# Patient Record
Sex: FEMALE | Race: WHITE | Employment: OTHER | URBAN - METROPOLITAN AREA
[De-identification: names, ages, dates, MRNs, and addresses within clinical notes are randomized per-mention and may not be internally consistent; named-entity substitution may affect disease eponyms.]

---

## 2017-09-19 ENCOUNTER — APPOINTMENT (OUTPATIENT)
Dept: CT IMAGING | Age: 82
DRG: 440 | End: 2017-09-19
Attending: EMERGENCY MEDICINE
Payer: MEDICARE

## 2017-09-19 ENCOUNTER — HOSPITAL ENCOUNTER (INPATIENT)
Age: 82
LOS: 3 days | Discharge: HOME | DRG: 440 | End: 2017-09-22
Attending: EMERGENCY MEDICINE | Admitting: HOSPITALIST
Payer: MEDICARE

## 2017-09-19 DIAGNOSIS — I25.10 CAD, MULTIPLE VESSEL: ICD-10-CM

## 2017-09-19 DIAGNOSIS — K85.90 ACUTE PANCREATITIS, UNSPECIFIED COMPLICATION STATUS, UNSPECIFIED PANCREATITIS TYPE: Primary | ICD-10-CM

## 2017-09-19 LAB
ALBUMIN SERPL-MCNC: 4.2 G/DL (ref 3.4–5)
ALBUMIN/GLOB SERPL: 1.3 {RATIO} (ref 0.8–1.7)
ALP SERPL-CCNC: 100 U/L (ref 45–117)
ALT SERPL-CCNC: 158 U/L (ref 13–56)
ANION GAP BLD CALC-SCNC: 14 MMOL/L (ref 10–20)
AST SERPL-CCNC: 247 U/L (ref 15–37)
BASOPHILS # BLD: 0 K/UL (ref 0–0.06)
BASOPHILS NFR BLD: 0 % (ref 0–2)
BILIRUB DIRECT SERPL-MCNC: 0.7 MG/DL (ref 0–0.2)
BILIRUB SERPL-MCNC: 1.4 MG/DL (ref 0.2–1)
BUN BLD-MCNC: 14 MG/DL (ref 7–18)
CA-I BLD-MCNC: 1.29 MMOL/L (ref 1.12–1.32)
CHLORIDE BLD-SCNC: 92 MMOL/L (ref 100–108)
CK MB CFR SERPL CALC: NORMAL % (ref 0–4)
CK MB SERPL-MCNC: <1 NG/ML (ref 5–25)
CK SERPL-CCNC: 34 U/L (ref 26–192)
CO2 BLD-SCNC: 30 MMOL/L (ref 19–24)
CREAT UR-MCNC: 0.5 MG/DL (ref 0.6–1.3)
DIFFERENTIAL METHOD BLD: ABNORMAL
EOSINOPHIL # BLD: 0 K/UL (ref 0–0.4)
EOSINOPHIL NFR BLD: 0 % (ref 0–5)
ERYTHROCYTE [DISTWIDTH] IN BLOOD BY AUTOMATED COUNT: 12.9 % (ref 11.6–14.5)
GLOBULIN SER CALC-MCNC: 3.3 G/DL (ref 2–4)
GLUCOSE BLD STRIP.AUTO-MCNC: 145 MG/DL (ref 74–106)
HCT VFR BLD AUTO: 39.4 % (ref 35–45)
HCT VFR BLD CALC: 44 % (ref 36–49)
HGB BLD-MCNC: 13.8 G/DL (ref 12–16)
HGB BLD-MCNC: 15 G/DL (ref 12–16)
INR PPP: 1 (ref 0.8–1.2)
LACTATE BLD-SCNC: 1.3 MMOL/L (ref 0.4–2)
LIPASE SERPL-CCNC: ABNORMAL U/L (ref 73–393)
LYMPHOCYTES # BLD: 1.1 K/UL (ref 0.9–3.6)
LYMPHOCYTES NFR BLD: 7 % (ref 21–52)
MCH RBC QN AUTO: 31.5 PG (ref 24–34)
MCHC RBC AUTO-ENTMCNC: 35 G/DL (ref 31–37)
MCV RBC AUTO: 90 FL (ref 74–97)
MONOCYTES # BLD: 1 K/UL (ref 0.05–1.2)
MONOCYTES NFR BLD: 7 % (ref 3–10)
NEUTS SEG # BLD: 13 K/UL (ref 1.8–8)
NEUTS SEG NFR BLD: 86 % (ref 40–73)
PLATELET # BLD AUTO: 294 K/UL (ref 135–420)
PMV BLD AUTO: 9.1 FL (ref 9.2–11.8)
POTASSIUM BLD-SCNC: 3.4 MMOL/L (ref 3.5–5.5)
PROT SERPL-MCNC: 7.5 G/DL (ref 6.4–8.2)
PROTHROMBIN TIME: 12.8 SEC (ref 11.5–15.2)
RBC # BLD AUTO: 4.38 M/UL (ref 4.2–5.3)
SODIUM BLD-SCNC: 131 MMOL/L (ref 136–145)
TROPONIN I SERPL-MCNC: <0.02 NG/ML (ref 0–0.04)
WBC # BLD AUTO: 15.1 K/UL (ref 4.6–13.2)

## 2017-09-19 PROCEDURE — 74176 CT ABD & PELVIS W/O CONTRAST: CPT

## 2017-09-19 PROCEDURE — 93005 ELECTROCARDIOGRAM TRACING: CPT

## 2017-09-19 PROCEDURE — 80076 HEPATIC FUNCTION PANEL: CPT | Performed by: EMERGENCY MEDICINE

## 2017-09-19 PROCEDURE — 80047 BASIC METABLC PNL IONIZED CA: CPT

## 2017-09-19 PROCEDURE — 83690 ASSAY OF LIPASE: CPT | Performed by: EMERGENCY MEDICINE

## 2017-09-19 PROCEDURE — 77030011943

## 2017-09-19 PROCEDURE — 74011000250 HC RX REV CODE- 250: Performed by: EMERGENCY MEDICINE

## 2017-09-19 PROCEDURE — 82550 ASSAY OF CK (CPK): CPT | Performed by: EMERGENCY MEDICINE

## 2017-09-19 PROCEDURE — 74011250636 HC RX REV CODE- 250/636: Performed by: EMERGENCY MEDICINE

## 2017-09-19 PROCEDURE — 96376 TX/PRO/DX INJ SAME DRUG ADON: CPT

## 2017-09-19 PROCEDURE — 96375 TX/PRO/DX INJ NEW DRUG ADDON: CPT

## 2017-09-19 PROCEDURE — 99285 EMERGENCY DEPT VISIT HI MDM: CPT

## 2017-09-19 PROCEDURE — 65270000029 HC RM PRIVATE

## 2017-09-19 PROCEDURE — 85610 PROTHROMBIN TIME: CPT | Performed by: EMERGENCY MEDICINE

## 2017-09-19 PROCEDURE — 83605 ASSAY OF LACTIC ACID: CPT

## 2017-09-19 PROCEDURE — 85025 COMPLETE CBC W/AUTO DIFF WBC: CPT | Performed by: EMERGENCY MEDICINE

## 2017-09-19 PROCEDURE — 96374 THER/PROPH/DIAG INJ IV PUSH: CPT

## 2017-09-19 RX ORDER — SODIUM CHLORIDE, SODIUM LACTATE, POTASSIUM CHLORIDE, CALCIUM CHLORIDE 600; 310; 30; 20 MG/100ML; MG/100ML; MG/100ML; MG/100ML
100 INJECTION, SOLUTION INTRAVENOUS CONTINUOUS
Status: DISCONTINUED | OUTPATIENT
Start: 2017-09-19 | End: 2017-09-21

## 2017-09-19 RX ORDER — ONDANSETRON 2 MG/ML
4 INJECTION INTRAMUSCULAR; INTRAVENOUS
Status: COMPLETED | OUTPATIENT
Start: 2017-09-19 | End: 2017-09-19

## 2017-09-19 RX ORDER — FAMOTIDINE 10 MG/ML
20 INJECTION INTRAVENOUS
Status: COMPLETED | OUTPATIENT
Start: 2017-09-19 | End: 2017-09-19

## 2017-09-19 RX ORDER — MORPHINE SULFATE 2 MG/ML
2 INJECTION, SOLUTION INTRAMUSCULAR; INTRAVENOUS
Status: COMPLETED | OUTPATIENT
Start: 2017-09-19 | End: 2017-09-19

## 2017-09-19 RX ADMIN — MORPHINE SULFATE 2 MG: 2 INJECTION, SOLUTION INTRAMUSCULAR; INTRAVENOUS at 22:36

## 2017-09-19 RX ADMIN — ONDANSETRON 4 MG: 2 INJECTION INTRAMUSCULAR; INTRAVENOUS at 22:37

## 2017-09-19 RX ADMIN — SODIUM CHLORIDE, SODIUM LACTATE, POTASSIUM CHLORIDE, AND CALCIUM CHLORIDE 100 ML/HR: 600; 310; 30; 20 INJECTION, SOLUTION INTRAVENOUS at 23:35

## 2017-09-19 RX ADMIN — FAMOTIDINE 20 MG: 10 INJECTION, SOLUTION INTRAVENOUS at 22:36

## 2017-09-19 RX ADMIN — ONDANSETRON 4 MG: 2 INJECTION INTRAMUSCULAR; INTRAVENOUS at 23:26

## 2017-09-19 NOTE — IP AVS SNAPSHOT
[facility]ho[facility] Paredes 
 
 
 [address] [place] [address] 
[phone] Patient: Aaron Barragan MRN: [id number] Current Discharge Medication List  
  
CONTINUE these medications which have NOT CHANGED Dose & Instructions Dispensing Information Comments Morning Noon Evening Bedtime  
 aspirin 81 mg chewable tablet Dose:  81 mg Take 81 mg by mouth daily. Refills:  0  
     
  
   
   
   
  
 cholecalciferol (VITAMIN D3) 5,000 unit Tab tablet Commonly known as:  VITAMIN D3 Dose:  5000 Units Take 5,000 Units by mouth daily. Refills:  0  
     
  
   
   
   
  
 COLACE 100 mg capsule Generic drug:  docusate sodium Dose:  100 mg Take 100 mg by mouth daily. Refills:  0  
     
  
   
   
   
  
 cyanocobalamin 500 mcg tablet Commonly known as:  VITAMIN B12 Dose:  500 mcg Take 500 mcg by mouth daily. Refills:  0  
     
  
   
   
   
  
 dilTIAZem 120 mg SR capsule Commonly known as:  [facility] Dose:  120 mg Take 120 mg by mouth two (2) times a day. Refills:  0  
     
  
   
   
  
   
  
 fenofibrate nanocrystallized 48 mg tablet Commonly known as:  [employer] Dose:  48 mg Take 48 mg by mouth daily. Refills:  0  
     
  
   
   
   
  
 metoprolol tartrate 25 mg tablet Commonly known as:  [facility] Dose:  37.5 mg Take 37.5 mg by mouth two (2) times a day. Refills:  0  
     
  
   
   
  
   
  
 thiamine 100 mg tablet Commonly known as:  B-1 Dose:  100 mg Take 100 mg by mouth daily. Refills:  0  
     
  
   
   
   
  
 TYLENOL EXTRA STRENGTH 500 mg tablet Generic drug:  acetaminophen Dose:  500 mg Take 500 mg by mouth every four (4) hours as needed for Pain. Refills:  0

## 2017-09-19 NOTE — IP AVS SNAPSHOT
Felisha Ash 
 
 
 [address] [place] [address] 
[phone] Patient: Shoaib Jimenez MRN: [id number] You are allergic to the following Allergen Reactions Contrast Agent (Iodine) Anaphylaxis Amoxicillin Unknown (comments) Recent Documentation Height Weight Breastfeeding? BMI  
  
 1.702 m 68.1 kg No 23.51 kg/m2 Emergency Contacts Name Discharge Info Relation Home Work Mobile Tam Mann DISCHARGE CAREGIVER [3] Child [2] [phone] Kyrie Mann DISCHARGE CAREGIVER [3] Child [2]   [phone] About your hospitalization You were admitted on:  September 19, 2017 You last received care in the:  [facility] You were discharged on:  September 22, 2017 Unit phone number:  [phone] Why you were hospitalized Your primary diagnosis was:  Not on File Your diagnoses also included:  Acute Pancreatitis Providers Seen During Your Hospitalizations Provider Role Specialty Primary office phone Dot Vital MD Attending Provider Emergency Medicine [phone] Rakesh Real MD Attending Provider Internal Medicine [phone] Puja Connors MD Attending Provider Internal Medicine [phone] Your Primary Care Physician (PCP) Primary Care Physician Office Phone Office Fax Ga Sinha [phone] Follow-up Information Follow up With Details Comments Contact Info Bethanie Jon MD   [address] [address] 
[phone] Bethanie Jon MD Schedule an appointment as soon as possible for a visit in 1 week Follow up [facility] [address] [address] 
[phone] Current Discharge Medication List  
  
CONTINUE these medications which have NOT CHANGED Dose & Instructions Dispensing Information Comments Morning Noon Evening Bedtime aspirin 81 mg chewable tablet Dose:  81 mg Take 81 mg by mouth daily. Refills:  0  
     
  
   
   
   
  
 cholecalciferol (VITAMIN D3) 5,000 unit Tab tablet Commonly known as:  VITAMIN D3 Dose:  5000 Units Take 5,000 Units by mouth daily. Refills:  0  
     
  
   
   
   
  
 COLACE 100 mg capsule Generic drug:  docusate sodium Dose:  100 mg Take 100 mg by mouth daily. Refills:  0  
     
  
   
   
   
  
 cyanocobalamin 500 mcg tablet Commonly known as:  VITAMIN B12 Dose:  500 mcg Take 500 mcg by mouth daily. Refills:  0  
     
  
   
   
   
  
 dilTIAZem 120 mg SR capsule Commonly known as:  [facility] Dose:  120 mg Take 120 mg by mouth two (2) times a day. Refills:  0  
     
  
   
   
  
   
  
 fenofibrate nanocrystallized 48 mg tablet Commonly known as:  [employer] Dose:  48 mg Take 48 mg by mouth daily. Refills:  0  
     
  
   
   
   
  
 metoprolol tartrate 25 mg tablet Commonly known as:  LOPRESSOR Dose:  37.5 mg Take 37.5 mg by mouth two (2) times a day. Refills:  0  
     
  
   
   
  
   
  
 thiamine 100 mg tablet Commonly known as:  B-1 Dose:  100 mg Take 100 mg by mouth daily. Refills:  0  
     
  
   
   
   
  
 TYLENOL EXTRA STRENGTH 500 mg tablet Generic drug:  acetaminophen Dose:  500 mg Take 500 mg by mouth every four (4) hours as needed for Pain. Refills:  0 Discharge Instructions DISCHARGE SUMMARY from Nurse The following personal items are in your possession at time of discharge: 
 
Dental Appliances: None Visual Aid: Glasses, With patient Home Medications: None Jewelry: Earrings, Ring, With patient Clothing: Pants, Shirt, Undergarments, With patient Other Valuables: Eyeglasses, With patient PATIENT INSTRUCTIONS: 
 
 
F-face looks uneven A-arms unable to move or move unevenly S-speech slurred or non-existent T-time-call 911 as soon as signs and symptoms begin-DO NOT go Back to bed or wait to see if you get better-TIME IS BRAIN. Warning Signs of HEART ATTACK Call 911 if you have these symptoms: 
? Chest discomfort. Most heart attacks involve discomfort in the center of the chest that lasts more than a few minutes, or that goes away and comes back. It can feel like uncomfortable pressure, squeezing, fullness, or pain. ? Discomfort in other areas of the upper body. Symptoms can include pain or discomfort in one or both arms, the back, neck, jaw, or stomach. ? Shortness of breath with or without chest discomfort. ? Other signs may include breaking out in a cold sweat, nausea, or lightheadedness. Don't wait more than five minutes to call [address]! Fast action can save your life. Calling 911 is almost always the fastest way to get lifesaving treatment. Emergency Medical Services staff can begin treatment when they arrive  up to an hour sooner than if someone gets to the hospital by car. The discharge information has been reviewed with the patient. The patient verbalized understanding. Discharge medications reviewed with the patient and appropriate educational materials and side effects teaching were provided. I have reviewed discharge instructions with the patient. The patient verbalized understanding. Patient armband removed and shredded Discharge Instructions Attachments/References ANGINA (ENGLISH) CORONARY ANGIOGRAM: POST-OP (ENGLISH) DIABETES: HEART DISEASE: GENERAL INFO (ENGLISH) HEART: EXTERNAL VIEW - ARTERIES: ANATOMY SKETCH (ENGLISH) PANCREATITIS (ENGLISH) Discharge Orders None Introducing [facility]! Arleen Harmon introduces Wistone patient portal. Now you can access parts of your medical record, email your doctor's office, and request medication refills online. 1. In your internet browser, go to [email or web address] 2. Click on the First Time User? Click Here link in the Sign In box. You will see the New Member Sign Up page. 3. Enter your Wistone Access Code exactly as it appears below. You will not need to use this code after youve completed the sign-up process. If you do not sign up before the expiration date, you must request a new code. · Wistone Access Code: [email or web address] Expires: 12/21/2017  3:34 PM 
 
4. Enter the last four digits of your Social Security Number (xxxx) and Date of Birth (mm/dd/yyyy) as indicated and click Submit. You will be taken to the next sign-up page. 5. Create a Wistone ID. This will be your Wistone login ID and cannot be changed, so think of one that is secure and easy to remember. 6. Create a Wistone password. You can change your password at any time. 7. Enter your Password Reset Question and Answer. This can be used at a later time if you forget your password. 8. Enter your e-mail address. You will receive e-mail notification when new information is available in [address] 9. Click Sign Up. You can now view and download portions of your medical record. 10. Click the Download Summary menu link to download a portable copy of your medical information. If you have questions, please visit the Frequently Asked Questions section of the Wistone website. Remember, Wistone is NOT to be used for urgent needs. For medical emergencies, dial 911. Now available from your iPhone and Android! General Information Please provide this summary of care documentation to your next provider.  
  
  
    
    
 Patient Signature: ____________________________________________________________ Date:  ____________________________________________________________  
  
[facility] Provider Signature:  ____________________________________________________________ Date:  ____________________________________________________________ More Information Angina: Care Instructions Your Care Instructions You have a problem called angina. Angina happens when there is not enough blood flow to your heart muscle. Angina is a sign of coronary artery disease (CAD). CAD occurs when blood vessels that supply the heart become narrowed. Having CAD increases your risk of a heart attack. Chest pain or pressure is the most common symptom of angina. But some people have other symptoms, like: 
· Pain, pressure, or a strange feeling in the back, neck, jaw, or upper belly, or in one or both shoulders or arms. · Shortness of breath. · Nausea or vomiting. · Lightheadedness or sudden weakness. · Fast or irregular heartbeat. Women are somewhat more likely than men to have angina symptoms like shortness of breath, nausea, and back or jaw pain. Angina can be dangerous. That's why it is important to pay attention to your symptoms. Know what is typical for you, learn how to control your symptoms, and understand when you need to get treatment. A change in your usual pattern of symptoms is an emergency. It may mean that you are having a heart attack. The doctor has checked you carefully, but problems can develop later. If you notice any problems or new symptoms, get medical treatment right away. Follow-up care is a key part of your treatment and safety. Be sure to make and go to all appointments, and call your doctor if you are having problems. It's also a good idea to know your test results and keep a list of the medicines you take. How can you care for yourself at home? Medicines · If your doctor has given you nitroglycerin for angina symptoms, keep it with you at all times. If you have symptoms, sit down and rest, and take the first dose of nitroglycerin as directed. If your symptoms get worse or are not getting better within 5 minutes, call 911 right away. Stay on the phone. The emergency  will give you further instructions. · If your doctor advises it, take 1 low-dose aspirin a day to prevent heart attack. · Be safe with medicines. Take your medicines exactly as prescribed. Call your doctor if you think you are having a problem with your medicine. You will get more details on the specific medicines your doctor prescribes. Lifestyle changes · Do not smoke. If you need help quitting, talk to your doctor about stop-smoking programs and medicines. These can increase your chances of quitting for good. · Eat a heart-healthy diet that is low in saturated fat and salt, and is high in fiber. Talk to your doctor or a dietitian about healthy eating. · Stay at a healthy weight. Or lose weight if you need to. Activity · Talk to your doctor about a level of activity that is safe for you. · If an activity causes angina symptoms, stop and rest. 
When should you call for help? Call 911 anytime you think you may need emergency care. For example, call if: 
· You passed out (lost consciousness). · You have symptoms of a heart attack. These may include: ¨ Chest pain or pressure, or a strange feeling in the chest. 
¨ Sweating. ¨ Shortness of breath. ¨ Nausea or vomiting. ¨ Pain, pressure, or a strange feeling in the back, neck, jaw, or upper belly or in one or both shoulders or arms. ¨ Lightheadedness or sudden weakness. ¨ A fast or irregular heartbeat. After you call 911, the  may tell you to chew 1 adult-strength or 2 to 4 low-dose aspirin. Wait for an ambulance. Do not try to drive yourself.  
· You have angina symptoms that do not go away with rest or are not getting better within 5 minutes after you take a dose of nitroglycerin. Call your doctor now or seek immediate medical care if: 
· You are having angina symptoms more often than usual, or they are different or worse than usual. 
· You feel dizzy or lightheaded, or you feel like you may faint. Watch closely for changes in your health, and be sure to contact your doctor if you have any problems. Where can you learn more? Go to http://guillermo-amy.info/. Enter H129 in the search box to learn more about \"Angina: Care Instructions. \" Current as of: April 3, 2017 Content Version: 11.3 © 1676-0226 [employer]. Care instructions adapted under license by [employer] (which disclaims liability or warranty for this information). If you have questions about a medical condition or this instruction, always ask your healthcare professional. [address] any warranty or liability for your use of this information. Coronary Angiogram: What to Expect at [facility] Your Recovery A coronary angiogram is a test to examine the large blood vessel of your heart (coronary artery). The doctor inserted a thin, flexible tube (catheter) into a blood vessel in your groin. In some cases, the catheter is placed in a blood vessel in the arm. Your groin or arm may have a bruise and feel sore for a day or two after a coronary angiogram. You can do light activities around the house but nothing strenuous for several days. This care sheet gives you a general idea about how long it will take for you to recover. But each person recovers at a different pace. Follow the steps below to feel better as quickly as possible. How can you care for yourself at home? Activity · If the doctor gave you a sedative: ¨ For 24 hours, don't do anything that requires attention to detail. It takes time for the medicine's effects to completely wear off. ¨ For your safety, do not drive or operate any machinery that could be dangerous. Wait until the medicine wears off and you can think clearly and react easily. · Do not do strenuous exercise and do not lift, pull, or push anything heavy until your doctor says it is okay. This may be for a day or two. You can walk around the house and do light activity, such as cooking. · If the catheter was placed in your groin, try not to walk up stairs for the first couple of days. · If the catheter was placed in your arm near your wrist, do not bend your wrist deeply for the first couple of days. Be careful using your hand to get into and out of a chair or bed. · If your doctor recommends it, get more exercise. Walking is a good choice. Bit by bit, increase the amount you walk every day. Try for at least 30 minutes on most days of the week. Diet · Drink plenty of fluids to help your body flush out the dye. If you have kidney, heart, or liver disease and have to limit fluids, talk with your doctor before you increase the amount of fluids you drink. · Keep eating a heart-healthy diet that has lots of fruits, vegetables, and whole grains. If you have not been eating this way, talk to your doctor. You also may want to talk to a dietitian. This expert can help you to learn about healthy foods and plan meals. Medicines · Your doctor will tell you if and when you can restart your medicines. He or she will also give you instructions about taking any new medicines. · If you take blood thinners, such as warfarin (Coumadin), clopidogrel (Plavix), or aspirin, be sure to talk to your doctor. He or she will tell you if and when to start taking those medicines again. Make sure that you understand exactly what your doctor wants you to do. · Your doctor may prescribe a blood-thinning medicine like aspirin or clopidogrel (Plavix).  It is very important that you take these medicines exactly as directed in order to keep the coronary artery open and reduce your risk of a heart attack. Be safe with medicines. Call your doctor if you think you are having a problem with your medicine. Care of the catheter site · For 1 or 2 days, keep a bandage over the spot where the catheter was inserted. The bandage probably will fall off in this time. · Put ice or a cold pack on the area for 10 to 20 minutes at a time to help with soreness or swelling. Put a thin cloth between the ice and your skin. · You may shower 24 to 48 hours after the procedure, if your doctor okays it. Pat the incision dry. · Do not soak the catheter site until it is healed. Don't take a bath for 1 week, or until your doctor tells you it is okay. Follow-up care is a key part of your treatment and safety. Be sure to make and go to all appointments, and call your doctor if you are having problems. It's also a good idea to know your test results and keep a list of the medicines you take. When should you call for help? Call 911 anytime you think you may need emergency care. For example, call if: 
· You passed out (lost consciousness). · You have severe trouble breathing. · You have sudden chest pain and shortness of breath, or you cough up blood. · You have symptoms of a heart attack. These may include: ¨ Chest pain or pressure, or a strange feeling in the chest. 
¨ Sweating. ¨ Shortness of breath. ¨ Nausea or vomiting. ¨ Pain, pressure, or a strange feeling in the back, neck, jaw, or upper belly, or in one or both shoulders or arms. ¨ Lightheadedness or sudden weakness. ¨ A fast or irregular heartbeat. After you call 911, the  may tel you to chew 1 adult-strength or 2 to 4 low-dose aspirin. Wait for an ambulance. Do not try to drive yourself. · You have been diagnosed with angina, and you have symptoms that do not go away with rest or are not getting better within 5 minutes after you take a dose of nitroglycerin. Call your doctor now or seek immediate medical care if: 
· You are bleeding from the area where the catheter was put in your artery. · You have a fast-growing, painful lump at the catheter site. · You have signs of infection, such as: 
¨ Increased pain, swelling, warmth, or redness. ¨ Red streaks leading from the catheter site. ¨ Pus draining from the catheter site. ¨ A fever. · Your leg or arm looks blue or feels cold, numb, or tingly. Watch closely for changes in your health, and be sure to contact your doctor if you have any problems. Where can you learn more? Go to http://guillermo-amy.info/. Enter R132 in the search box to learn more about \"Coronary Angiogram: What to Expect at Home. \" Current as of: January 13, 2017 Content Version: 11.3 © 9027-5129 [employer]. Care instructions adapted under license by [employer] (which disclaims liability or warranty for this information). If you have questions about a medical condition or this instruction, always ask your healthcare professional. [place] [address] any warranty or liability for your use of this information. Learning About Diabetes and Coronary Artery Disease How are diabetes and heart disease connected? Many people think diabetes and heart disease go hand in hand. But having diabetes doesn't have to mean that you are going to have a heart attack someday. Healthy living can help prevent many of the problems that come with both diabetes and heart disease. For some people, diabetes can cause problems in your body that may lead to heart disease. Diabetes can make the problems of heart disease worse. But here's the good news: The good things you're doing to stay healthy with diabeteseating healthy foods, quitting smoking, getting exercise and moreare also helping your heart. How can diabetes lead to heart disease? The same things that make diabetes a serious condition can also lead to heart disease or make it worse. · High cholesterol causes the buildup of a kind of fat inside the blood vessel walls, making them too narrow. This reduces the flow of blood and can cause a heart attack. · High blood pressure pushes blood through the arteries with too much force. Over time, this damages the walls of the arteries. · High blood sugar can damage the lining of blood vessels. This can lead to the hardening and narrowing of the arteries, resulting in less blood flow to the heart. Diabetes also increases your risk for kidney damage. If you have signs of kidney damage, you may also have a higher risk for heart disease. Kidney damage shares many of the risk factors for heart disease (such as high cholesterol, high blood pressure, and high blood sugar). How can you keep your heart healthy when you have diabetes? Managing your diabetes and keeping your heart healthy are two sides of the same coin. Here are some things you can do. · Test your blood sugar levels and get your diabetes tests on schedule. Try to keep your numbers within your target range. · Keep track of your blood pressure. The target for most people with diabetes is below 140/90. Your doctor will give you a goal that's right for you. If your blood pressure is high, your treatment may also include medicine. Changes in your lifestyle, such as staying at a healthy weight, may also help you lower your blood pressure. · Eat heart-healthy foods. These include fruits, vegetables, whole grains, fish, and low-fat or nonfat dairy foods. Limit sodium, alcohol, and sweets. · If your doctor recommends it, get more exercise. Walking is a good choice. Bit by bit, increase the amount you walk every day. Try for at least 30 minutes on most days of the week. · Do not smoke. Smoking can make diabetes and heart disease worse.  If you need help quitting, talk to your doctor about stop-smoking programs and medicines. These can increase your chances of quitting for good. · Your doctor may talk with you about taking medicines for your heart. For example, your doctor may suggest taking a statin or daily aspirin. Where can you learn more? Go to http://guillermo-amy.info/. Enter B338 in the search box to learn more about \"Learning About Diabetes and Coronary Artery Disease. \" Current as of: March 13, 2017 Content Version: 11.3 © 9147-6390 [employer]. Care instructions adapted under license by [employer] (which disclaims liability or warranty for this information). If you have questions about a medical condition or this instruction, always ask your healthcare professional. [address] any warranty or liability for your use of this information. External View of the Heart: Anatomy Sketch Current as of: January 5, 2017 Content Version: 11.3 © 2279-8169 [employer]. Care instructions adapted under license by [employer] (which disclaims liability or warranty for this information). If you have questions about a medical condition or this instruction, always ask your healthcare professional. [address] any warranty or liability for your use of this information. Pancreatitis: Care Instructions Your Care Instructions The pancreas is an organ behind the stomach. It makes hormones and enzymes to help your body digest food. But if these enzymes attack the pancreas, it can get inflamed. This is called pancreatitis. Most cases are caused by gallstones or by heavy alcohol use. If you take care of yourself at home, it will help you get better. It will also help you avoid more problems with your pancreas. Follow-up care is a key part of your treatment and safety.  Be sure to make and go to all appointments, and call your doctor if you are having problems. It's also a good idea to know your test results and keep a list of the medicines you take. How can you care for yourself at home? · Drink clear liquids and eat bland foods until you feel better. [place] foods include rice, dry toast, and crackers. They also include bananas and applesauce. · Eat a low-fat diet until your doctor says your pancreas is healed. · Do not drink alcohol. Tell your doctor if you need help to quit. Counseling, support groups, and sometimes medicines can help you stay sober. · Be safe with medicines. Read and follow all instructions on the label. ¨ If the doctor gave you a prescription medicine for pain, take it as prescribed. ¨ If you are not taking a prescription pain medicine, ask your doctor if you can take an over-the-counter medicine. · If your doctor prescribed antibiotics, take them as directed. Do not stop taking them just because you feel better. You need to take the full course of antibiotics. · Get extra rest until you feel better. To prevent future problems with your pancreas · Do not drink alcohol. · Tell your doctors and pharmacist that you've had pancreatitis. They can help you avoid medicines that may cause this problem again. When should you call for help? Call your doctor now or seek immediate medical care if: 
· You have new or severe belly pain. · You have a new or higher fever. · You can't keep fluid or medicines down. Watch closely for changes in your health, and be sure to contact your doctor if: · The symptoms you had when you first started feeling sick come back. · You do not get better as expected. · You need help to stop drinking alcohol. Where can you learn more? Go to http://guillermo-amy.info/. Enter Q963 in the search box to learn more about \"Pancreatitis: Care Instructions. \" Current as of: August 9, 2016 Content Version: 11.3 © 9212-7986 [employer]. Care instructions adapted under license by [employer] (which disclaims liability or warranty for this information). If you have questions about a medical condition or this instruction, always ask your healthcare professional. [address] any warranty or liability for your use of this information.

## 2017-09-20 ENCOUNTER — APPOINTMENT (OUTPATIENT)
Dept: ULTRASOUND IMAGING | Age: 82
DRG: 440 | End: 2017-09-20
Attending: INTERNAL MEDICINE
Payer: MEDICARE

## 2017-09-20 LAB
ALBUMIN SERPL-MCNC: 3.6 G/DL (ref 3.4–5)
ALBUMIN/GLOB SERPL: 1.4 {RATIO} (ref 0.8–1.7)
ALP SERPL-CCNC: 74 U/L (ref 45–117)
ALT SERPL-CCNC: 140 U/L (ref 13–56)
ANION GAP SERPL CALC-SCNC: 8 MMOL/L (ref 3–18)
APPEARANCE UR: CLEAR
AST SERPL-CCNC: 128 U/L (ref 15–37)
ATRIAL RATE: 77 BPM
BACTERIA URNS QL MICRO: ABNORMAL /HPF
BILIRUB SERPL-MCNC: 0.8 MG/DL (ref 0.2–1)
BILIRUB UR QL: NEGATIVE
BUN SERPL-MCNC: 12 MG/DL (ref 7–18)
BUN/CREAT SERPL: 27 (ref 12–20)
CALCIUM SERPL-MCNC: 8.7 MG/DL (ref 8.5–10.1)
CALCULATED P AXIS, ECG09: 93 DEGREES
CALCULATED R AXIS, ECG10: 60 DEGREES
CALCULATED T AXIS, ECG11: 72 DEGREES
CHLORIDE SERPL-SCNC: 95 MMOL/L (ref 100–108)
CHOLEST SERPL-MCNC: 170 MG/DL
CO2 SERPL-SCNC: 29 MMOL/L (ref 21–32)
COLOR UR: YELLOW
CREAT SERPL-MCNC: 0.45 MG/DL (ref 0.6–1.3)
DIAGNOSIS, 93000: NORMAL
EPITH CASTS URNS QL MICRO: ABNORMAL /LPF (ref 0–5)
ERYTHROCYTE [DISTWIDTH] IN BLOOD BY AUTOMATED COUNT: 13.2 % (ref 11.6–14.5)
GLOBULIN SER CALC-MCNC: 2.6 G/DL (ref 2–4)
GLUCOSE SERPL-MCNC: 104 MG/DL (ref 74–99)
GLUCOSE UR STRIP.AUTO-MCNC: NEGATIVE MG/DL
HCT VFR BLD AUTO: 36.8 % (ref 35–45)
HDLC SERPL-MCNC: 77 MG/DL (ref 40–60)
HDLC SERPL: 2.2 {RATIO} (ref 0–5)
HGB BLD-MCNC: 12.4 G/DL (ref 12–16)
HGB UR QL STRIP: ABNORMAL
IRON SATN MFR SERPL: 6 %
IRON SERPL-MCNC: 17 UG/DL (ref 50–175)
KETONES UR QL STRIP.AUTO: NEGATIVE MG/DL
LDLC SERPL CALC-MCNC: 84.2 MG/DL (ref 0–100)
LEUKOCYTE ESTERASE UR QL STRIP.AUTO: NEGATIVE
LIPID PROFILE,FLP: ABNORMAL
MCH RBC QN AUTO: 31.2 PG (ref 24–34)
MCHC RBC AUTO-ENTMCNC: 33.7 G/DL (ref 31–37)
MCV RBC AUTO: 92.7 FL (ref 74–97)
NITRITE UR QL STRIP.AUTO: NEGATIVE
P-R INTERVAL, ECG05: 190 MS
PH UR STRIP: 7.5 [PH] (ref 5–8)
PLATELET # BLD AUTO: 236 K/UL (ref 135–420)
PMV BLD AUTO: 9 FL (ref 9.2–11.8)
POTASSIUM SERPL-SCNC: 3.7 MMOL/L (ref 3.5–5.5)
PROT SERPL-MCNC: 6.2 G/DL (ref 6.4–8.2)
PROT UR STRIP-MCNC: NEGATIVE MG/DL
Q-T INTERVAL, ECG07: 380 MS
QRS DURATION, ECG06: 96 MS
QTC CALCULATION (BEZET), ECG08: 430 MS
RBC # BLD AUTO: 3.97 M/UL (ref 4.2–5.3)
RBC #/AREA URNS HPF: ABNORMAL /HPF (ref 0–5)
SODIUM SERPL-SCNC: 132 MMOL/L (ref 136–145)
SP GR UR REFRACTOMETRY: 1.01 (ref 1–1.03)
TIBC SERPL-MCNC: 277 UG/DL (ref 250–450)
TRIGL SERPL-MCNC: 44 MG/DL (ref ?–150)
UROBILINOGEN UR QL STRIP.AUTO: 1 EU/DL (ref 0.2–1)
VENTRICULAR RATE, ECG03: 77 BPM
VLDLC SERPL CALC-MCNC: 8.8 MG/DL
WBC # BLD AUTO: 12.6 K/UL (ref 4.6–13.2)
WBC URNS QL MICRO: ABNORMAL /HPF (ref 0–4)

## 2017-09-20 PROCEDURE — 82103 ALPHA-1-ANTITRYPSIN TOTAL: CPT | Performed by: INTERNAL MEDICINE

## 2017-09-20 PROCEDURE — 76705 ECHO EXAM OF ABDOMEN: CPT

## 2017-09-20 PROCEDURE — 65270000029 HC RM PRIVATE

## 2017-09-20 PROCEDURE — 80053 COMPREHEN METABOLIC PANEL: CPT | Performed by: HOSPITALIST

## 2017-09-20 PROCEDURE — 86308 HETEROPHILE ANTIBODY SCREEN: CPT | Performed by: INTERNAL MEDICINE

## 2017-09-20 PROCEDURE — 83540 ASSAY OF IRON: CPT | Performed by: INTERNAL MEDICINE

## 2017-09-20 PROCEDURE — 74011250636 HC RX REV CODE- 250/636: Performed by: HOSPITALIST

## 2017-09-20 PROCEDURE — 83516 IMMUNOASSAY NONANTIBODY: CPT | Performed by: INTERNAL MEDICINE

## 2017-09-20 PROCEDURE — 36415 COLL VENOUS BLD VENIPUNCTURE: CPT | Performed by: HOSPITALIST

## 2017-09-20 PROCEDURE — 77030020263 HC SOL INJ SOD CL0.9% LFCR 1000ML

## 2017-09-20 PROCEDURE — 80061 LIPID PANEL: CPT | Performed by: HOSPITALIST

## 2017-09-20 PROCEDURE — 80074 ACUTE HEPATITIS PANEL: CPT | Performed by: INTERNAL MEDICINE

## 2017-09-20 PROCEDURE — 85027 COMPLETE CBC AUTOMATED: CPT | Performed by: HOSPITALIST

## 2017-09-20 PROCEDURE — 81001 URINALYSIS AUTO W/SCOPE: CPT | Performed by: EMERGENCY MEDICINE

## 2017-09-20 PROCEDURE — 86664 EPSTEIN-BARR NUCLEAR ANTIGEN: CPT | Performed by: INTERNAL MEDICINE

## 2017-09-20 RX ORDER — DOCUSATE SODIUM 100 MG/1
100 CAPSULE, LIQUID FILLED ORAL DAILY
COMMUNITY

## 2017-09-20 RX ORDER — ACETAMINOPHEN 500 MG
500 TABLET ORAL
COMMUNITY

## 2017-09-20 RX ORDER — DILTIAZEM HYDROCHLORIDE 120 MG/1
120 CAPSULE, EXTENDED RELEASE ORAL 2 TIMES DAILY
COMMUNITY

## 2017-09-20 RX ORDER — LANOLIN ALCOHOL/MO/W.PET/CERES
100 CREAM (GRAM) TOPICAL DAILY
COMMUNITY

## 2017-09-20 RX ORDER — HEPARIN SODIUM 5000 [USP'U]/ML
5000 INJECTION, SOLUTION INTRAVENOUS; SUBCUTANEOUS EVERY 12 HOURS
Status: DISCONTINUED | OUTPATIENT
Start: 2017-09-20 | End: 2017-09-22 | Stop reason: HOSPADM

## 2017-09-20 RX ORDER — CHOLECALCIFEROL TAB 125 MCG (5000 UNIT) 125 MCG
5000 TAB ORAL DAILY
COMMUNITY

## 2017-09-20 RX ORDER — MORPHINE SULFATE 2 MG/ML
2 INJECTION, SOLUTION INTRAMUSCULAR; INTRAVENOUS
Status: DISCONTINUED | OUTPATIENT
Start: 2017-09-20 | End: 2017-09-22 | Stop reason: HOSPADM

## 2017-09-20 RX ORDER — METOPROLOL TARTRATE 25 MG/1
37.5 TABLET, FILM COATED ORAL 2 TIMES DAILY
COMMUNITY

## 2017-09-20 RX ORDER — LANOLIN ALCOHOL/MO/W.PET/CERES
500 CREAM (GRAM) TOPICAL DAILY
COMMUNITY

## 2017-09-20 RX ORDER — GUAIFENESIN 100 MG/5ML
81 LIQUID (ML) ORAL DAILY
COMMUNITY

## 2017-09-20 RX ORDER — FENOFIBRATE 48 MG/1
48 TABLET, COATED ORAL DAILY
COMMUNITY

## 2017-09-20 RX ORDER — SODIUM CHLORIDE 9 MG/ML
75 INJECTION, SOLUTION INTRAVENOUS CONTINUOUS
Status: DISCONTINUED | OUTPATIENT
Start: 2017-09-20 | End: 2017-09-22 | Stop reason: HOSPADM

## 2017-09-20 RX ADMIN — SODIUM CHLORIDE 75 ML/HR: 900 INJECTION, SOLUTION INTRAVENOUS at 10:46

## 2017-09-20 RX ADMIN — SODIUM CHLORIDE 75 ML/HR: 900 INJECTION, SOLUTION INTRAVENOUS at 21:05

## 2017-09-20 RX ADMIN — HEPARIN SODIUM 5000 UNITS: 5000 INJECTION, SOLUTION INTRAVENOUS; SUBCUTANEOUS at 01:00

## 2017-09-20 RX ADMIN — MORPHINE SULFATE 2 MG: 2 INJECTION, SOLUTION INTRAMUSCULAR; INTRAVENOUS at 21:05

## 2017-09-20 RX ADMIN — HEPARIN SODIUM 5000 UNITS: 5000 INJECTION, SOLUTION INTRAVENOUS; SUBCUTANEOUS at 14:30

## 2017-09-20 NOTE — PROGRESS NOTES
Nutrition initial assessment/Plan of care      RECOMMENDATIONS:   1. NPO. Advance diet when medically indicated  2. Monitor weight and PO intake  3. RD to follow     GOALS:   1. PO intake meets >75% of protein/calorie needs by 9/23  2. Weight Maintenance (+/- 1-2 lb) by 9/27        ASSESSMENT:   Per BMI of 22.9, weight is in the normal classification. 9% weight loss in the last 3 months which is significant. PO intake is poor vs NPO order. Labs noted. Patient with hyponatremia and elevated lipase level. Nutrition recommendations listed. RD to follow. Nutrition Diagnoses: Altered GI function related to pancreatitis as evidenced by elevated lipase level (22 120). Nutrition Risk:  [x] High  [] Moderate []  Low    SUBJECTIVE/OBJECTIVE:   Patient admitted for pancreatitis. Patient feels better and would like some jello. She states that her UBW was 160 lb in June. She lost weight over the summer (not intentional). Patient doesn't know how she lost weight because she was eating the same. Suggested patient to drink CIB daily at discharge to avoid further weight loss. No known food allergy. Information Obtained from:    [x] Chart Review   [x] Patient   [x] Family/Caregiver   [] Nurse/Physician   [] Interdisciplinary Meeting/Rounds    Diet: NPO  Medications: [x] Reviewed (0.9%NaCl: 75 ml/hr, LR: 100 ml/hr)   Allergies: [x] Reviewed   Encounter Diagnoses     ICD-10-CM ICD-9-CM   1. Acute pancreatitis, unspecified complication status, unspecified pancreatitis type K85.90 577.0   2. CAD, multiple vessel I25.10 414.00     No past medical history on file.    Labs:    Lab Results   Component Value Date/Time    Sodium 132 09/20/2017 05:07 AM    Potassium 3.7 09/20/2017 05:07 AM    Chloride 95 09/20/2017 05:07 AM    CO2 29 09/20/2017 05:07 AM    Anion gap 8 09/20/2017 05:07 AM    Glucose 104 09/20/2017 05:07 AM    BUN 12 09/20/2017 05:07 AM    Creatinine 0.45 09/20/2017 05:07 AM    Calcium 8.7 09/20/2017 05:07 AM Albumin 3.6 09/20/2017 05:07 AM     Anthropometrics: BMI (calculated): 22.9  Last 3 Recorded Weights in this Encounter    09/19/17 2214 09/20/17 0622   Weight: 68 kg (150 lb) 66.4 kg (146 lb 6.4 oz)      Ht Readings from Last 1 Encounters:   09/20/17 5' 7\" (1.702 m)     IBW: 135 lb %IBW: 108% UBW: 160 lb %UBW: 91%   [x] Weight Loss [] Weight Gain [] Weight Stable    Estimated Nutrition Needs: [x] MSJ  [] Other:  Calories: 2576-5723 kcal Based on:   [x] Actual BW    Protein:   70-85 g Based on:   [x] Actual BW    Fluid:       4508-7003 ml Based on:   [x] Actual BW      [x] No Cultural, Yarsanism or ethnic dietary need identified.     [] Cultural, Yarsanism and ethnic food preferences identified and addressed     Wt Status:  [x] Normal (18.6 - 24.9) [] Underweight (<18.5) [] Overweight (25 - 29.9) [] Mild Obesity (30 - 34.9)  [] Moderate Obesity (35 - 39.9) [] Morbid Obesity (40+)   [] Moderate Malnutrition [] Severe Malnutrition in the context of :     Nutrition Problems Identified:   [x] Suboptimal PO intake   [] Food Allergies  [] Difficulty chewing/swallowing/poor dentition  [] Constipation/Diarrhea   [] Nausea/Vomiting   [] None  [] Other:     Plan:   [] Therapeutic Diet  []  Obtained/adjusted food preferences/tolerances and/or snacks options   []  Supplements added   [] Occupational therapy following for feeding techniques  []  HS snack added   []  Modify diet texture   []  Modify diet for food allergies   [x]  Educate patient   []  Assist with menu selection   [x]  Monitor PO intake on meal rounds   [x]  Continue inpatient monitoring and intervention   []  Participated in discharge planning/Interdisciplinary rounds/Team meetings   []  Other:     Education Needs:   [] Not appropriate for teaching at this time due to:   [x] Identified and addressed    Nutrition Monitoring and Evaluation:  [x] Continue ongoing monitoring and intervention  [] Other    Haleigh Bell, [address] [place] [address]  Pager: [phone]

## 2017-09-20 NOTE — CDMP QUERY
Please clarify if this patient is being treated/managed for:    =>Hyponatremia  =>Other Explanation of clinical findings  =>Unable to Determine (no explanation of clinical findings)    The medical record reflects the following:    Risk: 81 y/o female adm with pancreatitis    Clinical Indicators: 9/19 Na- 131,    9/20 Na  132    Treatment: IVF NS 75cc/hr    Please clarify and document your clinical opinion in the progress notes and discharge summary.     Thank you,  [facility],[address], [address] [place] [address]

## 2017-09-20 NOTE — ACP (ADVANCE CARE PLANNING)
Patient has designated _____children___________________ to participate in his/her discharge plan and to receive any needed information. Son, Carly Kathleen, has POA.     Name: Jaquelin Aguilar: per chart number is [phone]/ Patricia Mass: per chart is [phone]

## 2017-09-20 NOTE — MANAGEMENT PLAN
[facility]/[address]   Discharge Planning/ Assessment    Reasons for Intervention:   Interviewed patient. Verified demographics listed on face sheet with patient; pt states she has Medicare A&B, D and  [employer]. Patient stated their PCP is Dr Scarlet Thompson and last appt 8/23 . Patient lives in [facility] alone. Patient's NOK is children, Karthikeyan Vasquez: per chart number is [phone]/ Renate Helms: per chart is [phone]. Al Butts has POA. Patient mostly independent with ADLs prior to admission. Family frequently checks on her and drives her. Pt states she was recently in hospital for a month at [facility], went to [facility] for 2 weeks for rehab and d/c'd home with [facility]. DME prior to admission: has a walker at home. Discharge plan is Home.  States is going to [facility] to have gallbladder removed  Kath Polk RN BSN  Outcomes Manager  Pager # [phone]    High Risk Criteria  [] Yes  [x]No   Physician Referral  [] Yes  [x]No        Date    Nursing Referral  [] Yes  [x]No        Date    Patient/Family Request  [] Yes  [x]No        Date       Resources:    Medicare  [x] Yes  []No   Medicaid  [] Yes  [x]No   No Resources  [] Yes  [x]No   Private Insurance  [] Yes  [x]No    Name/Phone Number    Other  [x] Yes  []No        (i.e. Workman's Comp)         Prior Services:    Prior Services  [x] Yes  []No   Home Health  [x] Yes  []No   [address]  [] Yes  [x]No        Number of [address]  [] Yes  [x]No       Meals on Wheels  [] Yes  [x]No   Office on Aging  [] Yes  [x]No   Transportation Services  [] Yes  [x]No   Nursing Home  [] Yes  [x]No        Nursing Home Name    [address]  [x] Yes  []No        [address] Name    Other       Information Source:      Information obtained from  [x] Patient  [] Parent   [] [address]  [] Child  [] Spouse   [] Significant Other/Partner   [] Friend      [] EMS    [] Nursing Home Chart          [] Other:   Chart Review  [x] Yes  []No     Family/Support System:    Patient lives with  [x] Alone    [] Spouse   [] Significant Other  [] Children  [] Caretaker   [] Parent  [] Sibling     [] Other       Other Support System:    Is the patient responsible for care of others  [] Yes  [x]No   Information of person caring for patient on  discharge    Managers financial affairs independently  [x] Yes  []No   If no, explain:      Status Prior to Admission:    Mental Status  [x] Awake  [x] Alert  [x] Oriented  [] Quiet/Calm [] Lethargic/Sedated   [] Disoriented  [] Restless/Anxious  [] Combative   Personal Care  [] Dependent  [x] [address]  [] Requires Assistance   Meal Preparation Ability  [x] Independent   [] Standby Assistance   [] Minimal Assistance   [] Moderate Assistance  [] Maximum Assistance     [] Total Assistance   Chores  [] Independent with Chores   [] N/A Nursing Home Resident   [x] Requires Assistance   Bowel/Bladder  [x] Continent  [] Catheter  [] Incontinent  [] Ostomy Self-Care    [] Urine Diversion Self-Care  [] Maximum Assistance     [] Total Assistance   Number of Persons needed for assistance    DME at home  [] Jyoti Mascorro  [] Chicho Mascorro   [] Commode    [] Bathroom/Grab Bars  [] Hospital Bed  [] Nebulizer  [] Oxygen           [] Raised Toilet Seat  [] Shower Chair  [] Side Rails for Bed   [] Tub Transfer Bench   [x] Jessika Mendieta  [] Meghann Morales, Standard      [] Other:   Vendor      Treatment Presently Receiving:    Current Treatments  [] Chemotherapy  [] Dialysis  [] Insulin  [] IVAB [x] IVF   [] O2  [] PCA   [] PT   [] RT   [] Tube Feedings   [] Wound Care     Psychosocial Evaluation:    Verbalized Knowledge of Disease Process  [] Patient  []Family   Coping with Disease Process  [] Patient  []Family   Requires Further Counseling Coping with Disease Process  [] Patient  []Family     Identified Projected Needs:    Home Health Aid  [] Yes  [x]No   Transportation  [] Yes  [x]No Education  [] Yes  [x]No        Specific Education     Financial Counseling  [] Yes  [x]No   Inability to Care for Self/Will Require 24 hour care  [] Yes  [x]No   Pain Management  [] Yes  [x]No   Home Infusion Therapy  [] Yes  [x]No   Oxygen Therapy  [] Yes  [x]No   DME  [] Yes  [x]No   Long Term Care Placement  [] Yes  [x]No   Rehab  [] Yes  [x]No   Physical Therapy  [x] Yes  []No   Needs Anticipated At This Time  [x] Yes  []No     Intra-Hospital Referral:    [facility]  [] Yes  [x]No     [] Yes  [x]No   Patient Representative  [] Yes  [x]No   Staff for Teaching Needs  [] Yes  [x]No   Specialty Teaching Needs     Diabetic Educator  [] Yes  [x]No   Referral for Diabetic Educator Needed  [] Yes  [x]No  If Yes, place order for Nutritionist or Diabetic Consult     Tentative Discharge Plan:    Home with No Services  [x] Yes  []No   Home with Home Health Follow-up  [] Yes  [x]No        If Yes, specify type    Home Care Program  [] Yes  [x]No        If Yes, specify type    Meals on Wheels  [] Yes  [x]No   Office of Aging  [] Yes  [x]No   NHP  [] Yes  [x]No   Return to the Nursing Home  [] Yes  [x]No   Rehab Therapy  [] Yes  [x]No   Acute Rehab  [] Yes  [x]No   Subacute Rehab  [] Yes  [x]No   Private Care  [] Yes  [x]No   Substance Abuse Referral  [] Yes  [x]No   Transportation  [] Yes  [x]No   Chore Service  [] Yes  [x]No   Inpatient Hospice  [] Yes  [x]No   OP RT  [] Yes  [x] No   OP Hemo  [] Yes  [x] No   OP PT  [] Yes  [x]No   Support Group  [] Yes  [x]No   Reach to Recovery  [] Yes  [x]No   OP Oncology Clinic  [] Yes  [x]No   Clinic Appointment  [] Yes  [x]No   DME  [] Yes  [x]No   Comments    Name of D/C Planner or  Given to Patient or Family Joseph Huerta RN BSN  Outcomes Manager  Pager # [phone]   Phone Number         Extension    Date 9/20/2017   Time 0930   If you are discharged home, whom do you designate to participate in your discharge plan and receive any information needed? Enter name of designee children        Phone # of designee         Address of designee         Updated         Patient refused to designate any           individual

## 2017-09-20 NOTE — H&P
[facility]  Hospitalist Division      History & Physical    Patient: Kenzie Samuel MRN: [id number]  [facility]: [id number]    YOB: 1929  Age: 80 y.o. Sex: female    DOA: 9/19/2017 LOS:  LOS: 1 day        DOA: 9/19/2017        Assessment/Plan     Active Problems:    Acute pancreatitis (9/19/2017)        Plan:  1. Acute Pancreatitis - unclear etiology - elevated LFT's also noted on labs - NPO for now , IVF, pain control, GI consult in Am   2. HTN - pt states she has a h/o HTN but no home meds noted , she also mentions she f/u with cardiology - will try to obtain home med list     DVT px - Heparin   Full code               HPI:     Kenzie Samuel is a 80 y.o. female who is being admitted to the hosp 2y to Acute pancreatitis. Pt mentions she developed Abd pain around 2.30pm yesterday which then got better but got worse again so she decided to come to the ER for further eval. No N/V   Pt is unclear about whether she has Gallstones or not - she does mention that she is supposed to f/u at [facility] to get Surg for her GB -- on asking further details she appears to be a little unclear   ER eval - pt noted to have elevated LFT's , CT scan shows Pancreatic inflammation -- ? Gall stone induced pancreatitis - will admit for further eval, GI consult in AM     No past medical history on file. No past surgical history on file. No family history on file.     Social History     Social History    Marital status:      Spouse name: N/A    Number of children: N/A    Years of education: N/A     Social History Main Topics    Smoking status: Not on file    Smokeless tobacco: Not on file    Alcohol use Not on file    Drug use: Not on file    Sexual activity: Not on file     Other Topics Concern    Not on file     Social History Narrative       Prior to Admission medications    Not on File       Allergies   Allergen Reactions    Contrast Agent [Iodine] Anaphylaxis    Amoxicillin Unknown (comments)       Review of Systems  A comprehensive review of systems was negative except for that written in the History of Present Illness. Physical Exam:      Visit Vitals    /78    Pulse 84    Temp 98.4 °F (36.9 °C)    Resp 18    Ht 5' 7\" (1.702 m)    Wt 68 kg (150 lb)    SpO2 93%    Breastfeeding No    BMI 23.49 kg/m2       Physical Exam:    Gen: In general, this is a thinly built female  in no acute distress  HEENT: Sclerae nonicteric. Oral mucous membranes moist. Dentition WNL  Neck: Supple with midline trachea. CV: RRR without murmur or rub appreciated. Resp:Respirations are unlabored without use of accessory muscles. Lung fields bilaterally without wheezes or rhonchi. Abd: Soft, tender epigastric region, nondistended. Extrem: Extremities are warm, without cyanosis or clubbing. No pitting pretibial edema. Palpable distal pulses X 4.   Skin: Warm, no visible rashes. Neuro: Patient is alert, oriented, and cooperative. No obvious focal defects. Moves all 4 extremities. Labs Reviewed:    Recent Results (from the past 24 hour(s))   POC LACTIC ACID    Collection Time: 09/19/17 10:29 PM   Result Value Ref Range    Lactic Acid (POC) 1.3 0.4 - 2.0 mmol/L   CBC WITH AUTOMATED DIFF    Collection Time: 09/19/17 10:30 PM   Result Value Ref Range    WBC 15.1 (H) 4.6 - 13.2 K/uL    RBC 4.38 4.20 - 5.30 M/uL    HGB 13.8 12.0 - 16.0 g/dL    HCT 39.4 35.0 - 45.0 %    MCV 90.0 74.0 - 97.0 FL    MCH 31.5 24.0 - 34.0 PG    MCHC 35.0 31.0 - 37.0 g/dL    RDW 12.9 11.6 - 14.5 %    PLATELET 979 509 - 099 K/uL    MPV 9.1 (L) 9.2 - 11.8 FL    NEUTROPHILS 86 (H) 40 - 73 %    LYMPHOCYTES 7 (L) 21 - 52 %    MONOCYTES 7 3 - 10 %    EOSINOPHILS 0 0 - 5 %    BASOPHILS 0 0 - 2 %    ABS. NEUTROPHILS 13.0 (H) 1.8 - 8.0 K/UL    ABS. LYMPHOCYTES 1.1 0.9 - 3.6 K/UL    ABS. MONOCYTES 1.0 0.05 - 1.2 K/UL    ABS. EOSINOPHILS 0.0 0.0 - 0.4 K/UL    ABS.  BASOPHILS 0.0 0.0 - 0.06 K/UL    DF AUTOMATED     PROTHROMBIN TIME + INR    Collection Time: 09/19/17 10:30 PM   Result Value Ref Range    Prothrombin time 12.8 11.5 - 15.2 sec    INR 1.0 0.8 - 1.2     HEPATIC FUNCTION PANEL    Collection Time: 09/19/17 10:30 PM   Result Value Ref Range    Protein, total 7.5 6.4 - 8.2 g/dL    Albumin 4.2 3.4 - 5.0 g/dL    Globulin 3.3 2.0 - 4.0 g/dL    A-G Ratio 1.3 0.8 - 1.7      Bilirubin, total 1.4 (H) 0.2 - 1.0 MG/DL    Bilirubin, direct 0.7 (H) 0.0 - 0.2 MG/DL    Alk.  phosphatase 100 45 - 117 U/L    AST (SGOT) 247 (H) 15 - 37 U/L    ALT (SGPT) 158 (H) 13 - 56 U/L   LIPASE    Collection Time: 09/19/17 10:30 PM   Result Value Ref Range    Lipase 99774 (H) 73 - 393 U/L   CARDIAC PANEL,(CK, CKMB & TROPONIN)    Collection Time: 09/19/17 10:30 PM   Result Value Ref Range    CK 34 26 - 192 U/L    CK - MB <1.0 <3.6 ng/ml    CK-MB Index  0.0 - 4.0 %     CALCULATION NOT PERFORMED WHEN RESULT IS BELOW LINEAR LIMIT    Troponin-I, Qt. <0.02 0.0 - 0.045 NG/ML   POC CHEM8    Collection Time: 09/19/17 10:30 PM   Result Value Ref Range    CO2, POC 30 (H) 19 - 24 MMOL/L    Glucose,  (H) 74 - 106 MG/DL    BUN, POC 14 7 - 18 MG/DL    Creatinine, POC 0.5 (L) 0.6 - 1.3 MG/DL    GFRAA, POC >60 >60 ml/min/1.73m2    GFRNA, POC >60 >60 ml/min/1.73m2    Sodium,  (L) 136 - 145 MMOL/L    Potassium, POC 3.4 (L) 3.5 - 5.5 MMOL/L    Calcium, ionized (POC) 1.29 1.12 - 1.32 MMOL/L    Chloride, POC 92 (L) 100 - 108 MMOL/L    Anion gap, POC 14 10 - 20      Hematocrit, POC 44 36 - 49 %    Hemoglobin, POC 15.0 12 - 16 G/DL   URINALYSIS W/ RFLX MICROSCOPIC    Collection Time: 09/20/17 12:07 AM   Result Value Ref Range    Color YELLOW      Appearance CLEAR      Specific gravity 1.014 1.005 - 1.030      pH (UA) 7.5 5.0 - 8.0      Protein NEGATIVE  NEG mg/dL    Glucose NEGATIVE  NEG mg/dL    Ketone NEGATIVE  NEG mg/dL    Bilirubin NEGATIVE  NEG      Blood TRACE (A) NEG      Urobilinogen 1.0 0.2 - 1.0 EU/dL    Nitrites NEGATIVE  NEG Leukocyte Esterase NEGATIVE  NEG     URINE MICROSCOPIC ONLY    Collection Time: 09/20/17 12:07 AM   Result Value Ref Range    WBC 0 to 3 0 - 4 /hpf    RBC 0 to 3 0 - 5 /hpf    Epithelial cells FEW 0 - 5 /lpf    Bacteria 3+ (A) NEG /hpf       Imaging Reviewed:    CT scan Reviewed       Inflammatory stranding and pancreatic edema consistent with acute pancreatitis. Prominent surrounding inflammation in the upper abdomen. -coronary artery disease. atherosclerosis. small hiatal hernia. sequelae of granulomatous disease. osseous degenerative changes with prominent compression deformities of multiple vertebral bodies; notably L4.         Kiara Ordonez MD  9/20/2017, 11:50 PM

## 2017-09-20 NOTE — PROGRESS NOTES
0030: Assumed care of alert and oriented female, no concerns at this time. Pt. Would like to move to [facility], that is her normal hospital.     0045: Admission assessment and documentation done, pt. Asked son to bring in medications in AM. No other concerns at this time, call bell within reach.

## 2017-09-20 NOTE — CONSULTS
Gastroenterology Consult    Patient: Casper Pryor MRN: [id number]  SSN: [id number]    YOB: 1929  Age: 80 y.o. Sex: female        Assessment:   1. Acute pancreatitis: Mrs. Severiano Christine is an 80 yr old female with acute pancreatitis. She had ct scan with no necrosis or fluid collection noted. She has improvement in symptoms with IV fluids and bowel rest.  She reports no alcohol use. No obvious medication source of pancreatitis. She has elevated liver enzymes that would suggest a biliary source. I recommend MRCP. She is concerned and not sure she would tolerate the MRI. Will therefore order RUQ Ultrasound to evaluate first.  Will continue bowel rest and fluids. 2. Abnormal liver enzymes:  She has elevated liver enzymes in a hepatocellular pattern . No history of liver disease or obvious source . No cirrhosis or signs of decompensation. Will again recommend RUQ ultrasound. Will order serologies for chronic liver disease. Will follow LFTS. Plan:   1. Ultrasound  2. Serologies for chronic liver diseases. 3. Continue bowel rest.   3. Continue IV fluids. Subjective: Casper Pryor is a 80 y.o. female who is being seen for pancreatitis. Mrs. Severiano Christine reports that she began having abdominal pain approximately one day prior to admission. The pain was across the epigastric area and was severe. The pain did not radiate to other areas. She had associated nausea and an episode of vomiting. She has not had pain like this in the past.  She had no hematemesis. She reports no heartburn or dysphagia. She has no other pain. She has not noted any diarrhea or constipation. No blood in stool and no melena. No recent travel or antibiotics. No recent medication changes . Upon admission she was found to have a lipase of 22,120. She was found to have elevated LFTS with TB of 14. With AST of 247 and .   She was treated conservatively with fluids and bowel rest.  She reports improvement in pain today. She has no further nausea or vomiting. She has no history of pancreas problems in the past.  She has no family history of pancreatic problems. She has not had trauma to her abdomen. She reports no alcohol use. She had EGD in 2014 by Dr. Arpan Law that noted a hiatal hernia and an esophageal ring that was dilated. She has no other complaints. .    Past Medical History  HTN  CAD  HLP    Past Surgical History  Appendectomy  EGD    Family History  No family history of any chronic GI illness. Social History  No alcohol or drug use. Medications  Current Facility-Administered Medications   Medication Dose Route Frequency    0.9% sodium chloride infusion  75 mL/hr IntraVENous CONTINUOUS    heparin (porcine) injection 5,000 Units  5,000 Units SubCUTAneous Q12H    morphine injection 2 mg  2 mg IntraVENous Q4H PRN    lactated Ringers infusion  100 mL/hr IntraVENous CONTINUOUS        Hospital Problems  Never Reviewed          Codes Class Noted POA    Acute pancreatitis ICD-10-CM: K85.90  ICD-9-CM: 478.1  9/19/2017 Unknown            Allergies   Allergen Reactions    Contrast Agent [Iodine] Anaphylaxis    Amoxicillin Unknown (comments)       Review of Systems:  A comprehensive review of systems was negative except for that written in the History of Present Illness. Objective:     Physical Exam:  Visit Vitals    /67    Pulse 82    Temp 99.6 °F (37.6 °C)    Resp 16    Ht 5' 7\" (1.702 m)    Wt 66.4 kg (146 lb 6.4 oz)    SpO2 93%    Breastfeeding No    BMI 22.93 kg/m2     General appearance: alert, cooperative, no distress, appears stated age  Lungs: clear to auscultation bilaterally  Heart: regular rate and rhythm, S1, S2 normal, no murmur, click, rub or gallop  Abdomen: soft, non-tender.  Bowel sounds normal. No masses,  no organomegaly    Recent Results (from the past 24 hour(s))   POC LACTIC ACID    Collection Time: 09/19/17 10:29 PM   Result Value Ref Range Lactic Acid (POC) 1.3 0.4 - 2.0 mmol/L   CBC WITH AUTOMATED DIFF    Collection Time: 09/19/17 10:30 PM   Result Value Ref Range    WBC 15.1 (H) 4.6 - 13.2 K/uL    RBC 4.38 4.20 - 5.30 M/uL    HGB 13.8 12.0 - 16.0 g/dL    HCT 39.4 35.0 - 45.0 %    MCV 90.0 74.0 - 97.0 FL    MCH 31.5 24.0 - 34.0 PG    MCHC 35.0 31.0 - 37.0 g/dL    RDW 12.9 11.6 - 14.5 %    PLATELET 773 874 - 531 K/uL    MPV 9.1 (L) 9.2 - 11.8 FL    NEUTROPHILS 86 (H) 40 - 73 %    LYMPHOCYTES 7 (L) 21 - 52 %    MONOCYTES 7 3 - 10 %    EOSINOPHILS 0 0 - 5 %    BASOPHILS 0 0 - 2 %    ABS. NEUTROPHILS 13.0 (H) 1.8 - 8.0 K/UL    ABS. LYMPHOCYTES 1.1 0.9 - 3.6 K/UL    ABS. MONOCYTES 1.0 0.05 - 1.2 K/UL    ABS. EOSINOPHILS 0.0 0.0 - 0.4 K/UL    ABS. BASOPHILS 0.0 0.0 - 0.06 K/UL    DF AUTOMATED     PROTHROMBIN TIME + INR    Collection Time: 09/19/17 10:30 PM   Result Value Ref Range    Prothrombin time 12.8 11.5 - 15.2 sec    INR 1.0 0.8 - 1.2     HEPATIC FUNCTION PANEL    Collection Time: 09/19/17 10:30 PM   Result Value Ref Range    Protein, total 7.5 6.4 - 8.2 g/dL    Albumin 4.2 3.4 - 5.0 g/dL    Globulin 3.3 2.0 - 4.0 g/dL    A-G Ratio 1.3 0.8 - 1.7      Bilirubin, total 1.4 (H) 0.2 - 1.0 MG/DL    Bilirubin, direct 0.7 (H) 0.0 - 0.2 MG/DL    Alk.  phosphatase 100 45 - 117 U/L    AST (SGOT) 247 (H) 15 - 37 U/L    ALT (SGPT) 158 (H) 13 - 56 U/L   LIPASE    Collection Time: 09/19/17 10:30 PM   Result Value Ref Range    Lipase 65506 (H) 73 - 393 U/L   CARDIAC PANEL,(CK, CKMB & TROPONIN)    Collection Time: 09/19/17 10:30 PM   Result Value Ref Range    CK 34 26 - 192 U/L    CK - MB <1.0 <3.6 ng/ml    CK-MB Index  0.0 - 4.0 %     CALCULATION NOT PERFORMED WHEN RESULT IS BELOW LINEAR LIMIT    Troponin-I, Qt. <0.02 0.0 - 0.045 NG/ML   POC CHEM8    Collection Time: 09/19/17 10:30 PM   Result Value Ref Range    CO2, POC 30 (H) 19 - 24 MMOL/L    Glucose,  (H) 74 - 106 MG/DL    BUN, POC 14 7 - 18 MG/DL    Creatinine, POC 0.5 (L) 0.6 - 1.3 MG/DL    GFRAA, POC >60 >60 ml/min/1.73m2    GFRNA, POC >60 >60 ml/min/1.73m2    Sodium,  (L) 136 - 145 MMOL/L    Potassium, POC 3.4 (L) 3.5 - 5.5 MMOL/L    Calcium, ionized (POC) 1.29 1.12 - 1.32 MMOL/L    Chloride, POC 92 (L) 100 - 108 MMOL/L    Anion gap, POC 14 10 - 20      Hematocrit, POC 44 36 - 49 %    Hemoglobin, POC 15.0 12 - 16 G/DL   EKG, 12 LEAD, INITIAL    Collection Time: 09/19/17 10:31 PM   Result Value Ref Range    Ventricular Rate 77 BPM    Atrial Rate 77 BPM    P-R Interval 190 ms    QRS Duration 96 ms    Q-T Interval 380 ms    QTC Calculation (Bezet) 430 ms    Calculated P Axis 93 degrees    Calculated R Axis 60 degrees    Calculated T Axis 72 degrees    Diagnosis       Normal sinus rhythm  Normal ECG  No previous ECGs available  Confirmed by Dimtriy Bryant ([id number]) on 9/20/2017 1:17:15 PM     URINALYSIS W/ RFLX MICROSCOPIC    Collection Time: 09/20/17 12:07 AM   Result Value Ref Range    Color YELLOW      Appearance CLEAR      Specific gravity 1.014 1.005 - 1.030      pH (UA) 7.5 5.0 - 8.0      Protein NEGATIVE  NEG mg/dL    Glucose NEGATIVE  NEG mg/dL    Ketone NEGATIVE  NEG mg/dL    Bilirubin NEGATIVE  NEG      Blood TRACE (A) NEG      Urobilinogen 1.0 0.2 - 1.0 EU/dL    Nitrites NEGATIVE  NEG      Leukocyte Esterase NEGATIVE  NEG     URINE MICROSCOPIC ONLY    Collection Time: 09/20/17 12:07 AM   Result Value Ref Range    WBC 0 to 3 0 - 4 /hpf    RBC 0 to 3 0 - 5 /hpf    Epithelial cells FEW 0 - 5 /lpf    Bacteria 3+ (A) NEG /hpf   METABOLIC PANEL, COMPREHENSIVE    Collection Time: 09/20/17  5:07 AM   Result Value Ref Range    Sodium 132 (L) 136 - 145 mmol/L    Potassium 3.7 3.5 - 5.5 mmol/L    Chloride 95 (L) 100 - 108 mmol/L    CO2 29 21 - 32 mmol/L    Anion gap 8 3.0 - 18 mmol/L    Glucose 104 (H) 74 - 99 mg/dL    BUN 12 7.0 - 18 MG/DL    Creatinine 0.45 (L) 0.6 - 1.3 MG/DL    BUN/Creatinine ratio 27 (H) 12 - 20      GFR est AA >60 >60 ml/min/1.73m2    GFR est non-AA >60 >60 ml/min/1.73m2    Calcium 8.7 8.5 - 10.1 MG/DL    Bilirubin, total 0.8 0.2 - 1.0 MG/DL    ALT (SGPT) 140 (H) 13 - 56 U/L    AST (SGOT) 128 (H) 15 - 37 U/L    Alk.  phosphatase 74 45 - 117 U/L    Protein, total 6.2 (L) 6.4 - 8.2 g/dL    Albumin 3.6 3.4 - 5.0 g/dL    Globulin 2.6 2.0 - 4.0 g/dL    A-G Ratio 1.4 0.8 - 1.7     LIPID PANEL    Collection Time: 09/20/17  5:07 AM   Result Value Ref Range    LIPID PROFILE          Cholesterol, total 170 <200 MG/DL    Triglyceride 44 <150 MG/DL    HDL Cholesterol 77 (H) 40 - 60 MG/DL    LDL, calculated 84.2 0 - 100 MG/DL    VLDL, calculated 8.8 MG/DL    CHOL/HDL Ratio 2.2 0 - 5.0     CBC W/O DIFF    Collection Time: 09/20/17  5:07 AM   Result Value Ref Range    WBC 12.6 4.6 - 13.2 K/uL    RBC 3.97 (L) 4.20 - 5.30 M/uL    HGB 12.4 12.0 - 16.0 g/dL    HCT 36.8 35.0 - 45.0 %    MCV 92.7 74.0 - 97.0 FL    MCH 31.2 24.0 - 34.0 PG    MCHC 33.7 31.0 - 37.0 g/dL    RDW 13.2 11.6 - 14.5 %    PLATELET 951 693 - 104 K/uL    MPV 9.0 (L) 9.2 - 11.8 FL         Signed By: Joseph Wyatt MD     September 20, 2017

## 2017-09-20 NOTE — MED STUDENT NOTES
Progress Note    Patient: Moshe De La Rosa MRN: [id number]  SSN: [id number]    YOB: 1929  Age: 80 y.o. Sex: female      Admit Date: 9/19/2017    LOS: 1 day     Subjective: Bari Alarcon, an 80year old female with a past medical history of hypertension and diverticulosis, presented with acute onset of sharp epigastric abdominal pain radiating to the back with associated vomiting. Labs showed an elevated lipase at 22,120 and elevated liver function tests. CT scan showed peripancreatic inflammatory stranding consistent with acute pancreatitis and mild, nonspecific gallbladder distention without radiopaque gallstones noted. Patient did mention a possible appointment with a surgeon at [facility] about her gallbladder, but seemed confused. Patient also denies any recent alcohol use. Currently, patient is in moderate pain but much improved from yesterday. Objective:     Vitals:    09/20/17 0002 09/20/17 0130 09/20/17 0622 09/20/17 0911   BP: 144/68 156/78 122/77 133/56   Pulse: 77 84 83 81   Resp: 19 18 18 16   Temp:  98.4 °F (36.9 °C) 98.1 °F (36.7 °C) 99 °F (37.2 °C)   SpO2: 94% 93% 91% 91%   Weight:   66.4 kg (146 lb 6.4 oz)    Height:   5' 7\" (1.702 m)         Intake and Output:  Current Shift: 09/20 0701 - 09/20 1900  In: -   Out: 200 [Urine:200]  Last three shifts:      Physical Exam:   GENERAL: alert, cooperative, no distress, appears stated age  LUNG: clear to auscultation bilaterally  HEART: regular rate and rhythm, S1, S2 normal, no murmur, click, rub or gallop  ABDOMEN: soft, tender to palpation in epigastric region. Bowel sounds normal. No masses,  no organomegaly.    EXTREMITIES:  extremities normal, atraumatic, no cyanosis or edema    Lab/Data Review:  CMP:   Lab Results   Component Value Date/Time     (L) 09/20/2017 05:07 AM    K 3.7 09/20/2017 05:07 AM    CL 95 (L) 09/20/2017 05:07 AM    CO2 29 09/20/2017 05:07 AM    AGAP 8 09/20/2017 05:07 AM     (H) 09/20/2017 05:07 AM BUN 12 09/20/2017 05:07 AM    CREA 0.45 (L) 09/20/2017 05:07 AM    GFRAA >60 09/20/2017 05:07 AM    GFRNA >60 09/20/2017 05:07 AM    CA 8.7 09/20/2017 05:07 AM    ALB 3.6 09/20/2017 05:07 AM    TP 6.2 (L) 09/20/2017 05:07 AM    GLOB 2.6 09/20/2017 05:07 AM    AGRAT 1.4 09/20/2017 05:07 AM    SGOT 128 (H) 09/20/2017 05:07 AM     (H) 09/20/2017 05:07 AM     CBC:   Lab Results   Component Value Date/Time    WBC 12.6 09/20/2017 05:07 AM    HGB 12.4 09/20/2017 05:07 AM    HCT 36.8 09/20/2017 05:07 AM     09/20/2017 05:07 AM     Recent Glucose Results:   Lab Results   Component Value Date/Time     (H) 09/20/2017 05:07 AM     COAGS:   Lab Results   Component Value Date/Time    PTP 12.8 09/19/2017 10:30 PM    INR 1.0 09/19/2017 10:30 PM     Liver Panel:   Lab Results   Component Value Date/Time    ALB 3.6 09/20/2017 05:07 AM    CBIL 0.7 (H) 09/19/2017 10:30 PM    TP 6.2 (L) 09/20/2017 05:07 AM    GLOB 2.6 09/20/2017 05:07 AM    AGRAT 1.4 09/20/2017 05:07 AM    SGOT 128 (H) 09/20/2017 05:07 AM     (H) 09/20/2017 05:07 AM    AP 74 09/20/2017 05:07 AM     Pancreatic Markers:   Lab Results   Component Value Date/Time    LPSE 12583 (H) 09/19/2017 10:30 PM          Assessment:     Active Problems:    Acute pancreatitis (9/19/2017)        Plan:     Continue IV fluids, NPO, and morphine as needed for pain. Determine if patient has an appointment with a surgeon at [facility] for her gallbladder. If so, resolve pain pancreatitis and eventual discharge with follow-up to this physician. Signed By: Paulo Null     September 20, 2017          *ATTENTION:  This note has been created by a medical student for educational purposes only. Please do not refer to the content of this note for clinical decision-making, billing, or other purposes. Please see attending physicians note to obtain clinical information on this patient. *

## 2017-09-20 NOTE — PROGRESS NOTES
Problem: Falls - Risk of  Goal: *Absence of Falls  Document Kaleigh Fall Risk and appropriate interventions in the flowsheet.    Outcome: Progressing Towards Goal  Fall Risk Interventions:  Mobility Interventions: Patient to call before getting OOB           Medication Interventions: Patient to call before getting OOB     Elimination Interventions: Bed/chair exit alarm

## 2017-09-20 NOTE — ED PROVIDER NOTES
HPI Comments: Maryan De La O is a 80 y.o. Female with c/o onset of mid upper abd pain that started about 230 pm today and has gotten progressively worse since with one episode of vomiting. Also sweats as well. No diarrhea, cp, sob, cough, syncope. Pain is sharp and radiates to back. Nothing taken. Called ems who brought her here. Remote h/o appy as child o/w no other sig abd issues. Noted to have diverticulosis on previous ct from [facility] last year with no aaa or other sig findings    The history is provided by the patient and medical records. No past medical history on file. No past surgical history on file. No family history on file. Social History     Social History    Marital status:      Spouse name: N/A    Number of children: N/A    Years of education: N/A     Occupational History    Not on file. Social History Main Topics    Smoking status: Not on file    Smokeless tobacco: Not on file    Alcohol use Not on file    Drug use: Not on file    Sexual activity: Not on file     Other Topics Concern    Not on file     Social History Narrative         ALLERGIES: Contrast agent [iodine] and Amoxicillin    Review of Systems   Constitutional: Positive for appetite change and diaphoresis. Negative for fever. HENT: Negative for trouble swallowing. Eyes: Negative for visual disturbance. Respiratory: Negative for cough and shortness of breath. Cardiovascular: Negative for chest pain. Gastrointestinal: Positive for abdominal distention and abdominal pain. Negative for blood in stool. Endocrine: Negative for polyuria. Genitourinary: Negative for difficulty urinating. Musculoskeletal: Positive for gait problem (due to pain). Skin: Negative for rash. Allergic/Immunologic: Negative for immunocompromised state. Neurological: Positive for light-headedness. Psychiatric/Behavioral: Positive for sleep disturbance.        Vitals:    09/19/17 2215 09/19/17 2230 09/19/17 2330 09/19/17 2345   BP: 132/52 100/78  151/48   Pulse: 77 77 99 82   Resp: 19 18 20 19   Temp:       SpO2: 93% 94% 94% 94%   Weight:       Height:                Physical Exam   Constitutional: She is oriented to person, place, and time. She appears well-developed and well-nourished. Non-toxic appearance. She does not have a sickly appearance. She appears ill. No distress. HENT:   Head: Normocephalic and atraumatic. Right Ear: External ear normal.   Left Ear: External ear normal.   Nose: Nose normal.   Mouth/Throat: Uvula is midline, oropharynx is clear and moist and mucous membranes are normal.   Eyes: Conjunctivae are normal. No scleral icterus. Neck: Neck supple. Cardiovascular: Normal rate, regular rhythm, normal heart sounds and intact distal pulses. Pulmonary/Chest: Effort normal and breath sounds normal.   Abdominal: Soft. She exhibits distension. She exhibits no pulsatile midline mass and no mass. There is tenderness. There is guarding. There is no rigidity, no rebound and no CVA tenderness. Musculoskeletal: She exhibits no edema. Neurological: She is alert and oriented to person, place, and time. Gait normal.   Skin: Skin is warm. She is diaphoretic. Psychiatric: Her behavior is normal.   Nursing note and vitals reviewed.        [facility]  ED Course       Procedures    Vitals:  Patient Vitals for the past 12 hrs:   Temp Pulse Resp BP SpO2   09/19/17 2345 - 82 19 151/48 94 %   09/19/17 2330 - 99 20 - 94 %   09/19/17 2230 - 77 18 100/78 94 %   09/19/17 2215 - 77 19 132/52 93 %   09/19/17 2214 98.7 °F (37.1 °C) 73 16 149/61 95 %         Medications ordered:   Medications   lactated Ringers infusion (100 mL/hr IntraVENous New Bag 9/19/17 2335)   ondansetron (ZOFRAN) injection 4 mg (4 mg IntraVENous Given 9/19/17 2237)   morphine injection 2 mg (2 mg IntraVENous Given 9/19/17 2236)   famotidine (PF) (PEPCID) injection 20 mg (20 mg IntraVENous Given 9/19/17 2236)   ondansetron (ZOFRAN) injection 4 mg (4 mg IntraVENous Given 9/19/17 3796)         Lab findings:  Recent Results (from the past 12 hour(s))   POC LACTIC ACID    Collection Time: 09/19/17 10:29 PM   Result Value Ref Range    Lactic Acid (POC) 1.3 0.4 - 2.0 mmol/L   CBC WITH AUTOMATED DIFF    Collection Time: 09/19/17 10:30 PM   Result Value Ref Range    WBC 15.1 (H) 4.6 - 13.2 K/uL    RBC 4.38 4.20 - 5.30 M/uL    HGB 13.8 12.0 - 16.0 g/dL    HCT 39.4 35.0 - 45.0 %    MCV 90.0 74.0 - 97.0 FL    MCH 31.5 24.0 - 34.0 PG    MCHC 35.0 31.0 - 37.0 g/dL    RDW 12.9 11.6 - 14.5 %    PLATELET 795 884 - 538 K/uL    MPV 9.1 (L) 9.2 - 11.8 FL    NEUTROPHILS 86 (H) 40 - 73 %    LYMPHOCYTES 7 (L) 21 - 52 %    MONOCYTES 7 3 - 10 %    EOSINOPHILS 0 0 - 5 %    BASOPHILS 0 0 - 2 %    ABS. NEUTROPHILS 13.0 (H) 1.8 - 8.0 K/UL    ABS. LYMPHOCYTES 1.1 0.9 - 3.6 K/UL    ABS. MONOCYTES 1.0 0.05 - 1.2 K/UL    ABS. EOSINOPHILS 0.0 0.0 - 0.4 K/UL    ABS. BASOPHILS 0.0 0.0 - 0.06 K/UL    DF AUTOMATED     PROTHROMBIN TIME + INR    Collection Time: 09/19/17 10:30 PM   Result Value Ref Range    Prothrombin time 12.8 11.5 - 15.2 sec    INR 1.0 0.8 - 1.2     HEPATIC FUNCTION PANEL    Collection Time: 09/19/17 10:30 PM   Result Value Ref Range    Protein, total 7.5 6.4 - 8.2 g/dL    Albumin 4.2 3.4 - 5.0 g/dL    Globulin 3.3 2.0 - 4.0 g/dL    A-G Ratio 1.3 0.8 - 1.7      Bilirubin, total 1.4 (H) 0.2 - 1.0 MG/DL    Bilirubin, direct 0.7 (H) 0.0 - 0.2 MG/DL    Alk.  phosphatase 100 45 - 117 U/L    AST (SGOT) 247 (H) 15 - 37 U/L    ALT (SGPT) 158 (H) 13 - 56 U/L   LIPASE    Collection Time: 09/19/17 10:30 PM   Result Value Ref Range    Lipase 83467 (H) 73 - 393 U/L   CARDIAC PANEL,(CK, CKMB & TROPONIN)    Collection Time: 09/19/17 10:30 PM   Result Value Ref Range    CK 34 26 - 192 U/L    CK - MB <1.0 <3.6 ng/ml    CK-MB Index  0.0 - 4.0 %     CALCULATION NOT PERFORMED WHEN RESULT IS BELOW LINEAR LIMIT    Troponin-I, Qt. <0.02 0.0 - 0.045 NG/ML   POC CHEM8    Collection Time: 09/19/17 10:30 PM   Result Value Ref Range    CO2, POC 30 (H) 19 - 24 MMOL/L    Glucose,  (H) 74 - 106 MG/DL    BUN, POC 14 7 - 18 MG/DL    Creatinine, POC 0.5 (L) 0.6 - 1.3 MG/DL    GFRAA, POC >60 >60 ml/min/1.73m2    GFRNA, POC >60 >60 ml/min/1.73m2    Sodium,  (L) 136 - 145 MMOL/L    Potassium, POC 3.4 (L) 3.5 - 5.5 MMOL/L    Calcium, ionized (POC) 1.29 1.12 - 1.32 MMOL/L    Chloride, POC 92 (L) 100 - 108 MMOL/L    Anion gap, POC 14 10 - 20      Hematocrit, POC 44 36 - 49 %    Hemoglobin, POC 15.0 12 - 16 G/DL       EKG interpretation by ED Physician:  nsr with no acute ns st changes anteriorly  Rate 77, qtc 430      X-Ray, CT or other radiology findings or impressions:  CT ABD PELV WO CONT    (Results Pending)     Findings: -Inflammatory stranding and pancreatic edema consistent with acute pancreatitis. Prominent surrounding inflammation in the upper abdomen. -coronary artery disease. atherosclerosis. small hiatal hernia. sequelae of granulomatous disease. osseous degenerative changes with prominent compression deformities of multiple vertebral bodies; notably L4. Progress notes, Consult notes or additional Procedure notes:   Given exam, will check ct. Pt allergic to iv contrast  Pain well controlled. D/w pt and sons need for admission. D/w Dr Bradly Andres who will admit    Reevaluation of patient:   Stable for admission    Disposition:  Diagnosis:   1. Acute pancreatitis, unspecified complication status, unspecified pancreatitis type    2.  CAD, multiple vessel        Disposition: admit      Follow-up Information     None            Patient's Medications    No medications on file

## 2017-09-21 LAB
A1AT SERPL-MCNC: 178 MG/DL (ref 90–200)
ACTIN IGG SERPL-ACNC: 14 UNITS (ref 0–19)
EBV EA IGG SER-ACNC: 19.7 U/ML (ref 0–8.9)
EBV NA IGG SER-ACNC: 127 U/ML (ref 0–17.9)
EBV VCA IGG SER-ACNC: 404 U/ML (ref 0–17.9)
EBV VCA IGM SER-ACNC: <36 U/ML (ref 0–35.9)
HETEROPH AB SER QL: NEGATIVE
INTERPRETATION, 169995: ABNORMAL
LIPASE SERPL-CCNC: 722 U/L (ref 73–393)
MITOCHONDRIA M2 IGG SER-ACNC: 6.6 UNITS (ref 0–20)

## 2017-09-21 PROCEDURE — 77030020263 HC SOL INJ SOD CL0.9% LFCR 1000ML

## 2017-09-21 PROCEDURE — 74011250637 HC RX REV CODE- 250/637: Performed by: HOSPITALIST

## 2017-09-21 PROCEDURE — 74011250636 HC RX REV CODE- 250/636: Performed by: HOSPITALIST

## 2017-09-21 PROCEDURE — 83690 ASSAY OF LIPASE: CPT | Performed by: HOSPITALIST

## 2017-09-21 PROCEDURE — 65270000029 HC RM PRIVATE

## 2017-09-21 PROCEDURE — 36415 COLL VENOUS BLD VENIPUNCTURE: CPT | Performed by: HOSPITALIST

## 2017-09-21 RX ORDER — METOPROLOL TARTRATE 25 MG/1
37.5 TABLET, FILM COATED ORAL 2 TIMES DAILY
Status: DISCONTINUED | OUTPATIENT
Start: 2017-09-21 | End: 2017-09-22 | Stop reason: HOSPADM

## 2017-09-21 RX ADMIN — METOPROLOL TARTRATE 37.5 MG: 25 TABLET ORAL at 13:28

## 2017-09-21 RX ADMIN — HEPARIN SODIUM 5000 UNITS: 5000 INJECTION, SOLUTION INTRAVENOUS; SUBCUTANEOUS at 12:23

## 2017-09-21 RX ADMIN — SODIUM CHLORIDE 75 ML/HR: 900 INJECTION, SOLUTION INTRAVENOUS at 18:52

## 2017-09-21 RX ADMIN — METOPROLOL TARTRATE 37.5 MG: 25 TABLET ORAL at 22:25

## 2017-09-21 NOTE — PROGRESS NOTES
PROGRESS NOTE   PATIENT:  Nimesh Ruiz           MRN: [id number]           Felicia, [birth year]           9/21/2017, 6:47 PM      SUBJECTIVE:  Patient denies abdominal pain, nausea or vomiting. She is tolerating a low fat diet. OBJECTIVE:  Patient Vitals for the past 24 hrs:   Temp Pulse Resp BP SpO2   09/21/17 1836 99 °F (37.2 °C) 95 18 157/88 -   09/21/17 1335 99 °F (37.2 °C) (!) 110 16 157/73 94 %   09/21/17 0941 98.2 °F (36.8 °C) 86 18 170/77 94 %   09/21/17 0659 98.6 °F (37 °C) 95 18 140/58 95 %   09/21/17 0217 98.9 °F (37.2 °C) 84 18 133/69 94 %   09/20/17 2208 99.6 °F (37.6 °C) 92 18 129/74 91 %         Intake/Output Summary (Last 24 hours) at 09/21/17 1847  Last data filed at 09/21/17 0545   Gross per 24 hour   Intake          1976.25 ml   Output              800 ml   Net          1176.25 ml       Gen: NAD  Lungs: Clear B/L   CVS exam: Regular rate and rhythm   Abd  : Soft, non tender, BS +, No masses felt. Labs: Results:   Chemistry Recent Labs      09/20/17 0507 09/19/17 2230   GLU  104*   --    NA  132*   --    K  3.7   --    CL  95*   --    CO2  29   --    BUN  12   --    CREA  0.45*   --    CA  8.7   --    AGAP  8   --    BUCR  27*   --    AP  74  100   TP  6.2*  7.5   ALB  3.6  4.2   GLOB  2.6  3.3   AGRAT  1.4  1.3    Estimated Creatinine Clearance: 84 mL/min (based on Cr of 0.45).    CBC w/Diff Recent Labs      09/20/17   0507 09/19/17 2230   WBC  12.6  15.1*   RBC  3.97*  4.38   HGB  12.4  13.8   HCT  36.8  39.4   PLT  236  294   GRANS   --   86*   LYMPH   --   7*   EOS   --   0      Cardiac Enzymes Recent Labs      09/19/17 2230   CPK  34   CKND1  CALCULATION NOT PERFORMED WHEN RESULT IS BELOW LINEAR LIMIT      Coagulation Recent Labs      09/19/17   2230   PTP  12.8   INR  1.0       Hepatitis Panel No results found for: HAMAT, HAAB, HABT, HAAT, HBSAG, HBSB, HBSAT, HBABN, HBCM, HBCAB, HBCAT, XBCABS, HBEAB, HBEAG, XHEPCS, [id number], HBEGLT, HBCMLT, HBCLT, HBEBLT, [id number], [id number], HAVMLT, [id number], HBCMLT, [id number], HCGAT   Amylase Lipase    Liver Enzymes Recent Labs      09/20/17   0507  09/19/17   2230   TP  6.2*  7.5   ALB  3.6  4.2   TBILI  0.8  1.4*   AP  74  100   SGOT  128*  247*   ALT  140*  158*      Thyroid Studies No results for input(s): T4, T3U, TSH, TSHEXT in the last 72 hours. No lab exists for component: T3RU     Pathology pathology         Allergies   Allergen Reactions    Contrast Agent [Iodine] Anaphylaxis    Amoxicillin Unknown (comments)       Current Facility-Administered Medications   Medication Dose Route Frequency    metoprolol tartrate (LOPRESSOR) tablet 37.5 mg  37.5 mg Oral BID    0.9% sodium chloride infusion  75 mL/hr IntraVENous CONTINUOUS    heparin (porcine) injection 5,000 Units  5,000 Units SubCUTAneous Q12H    morphine injection 2 mg  2 mg IntraVENous Q4H PRN    lactated Ringers infusion  100 mL/hr IntraVENous CONTINUOUS       ASSESSMENT:  Acute, uncomplicated pancreatitis, with elevated LFTs (improving), and gallbladder sludge. Suspect biliary pancreatitis. In order to prevent recurrence of pancreatitis, which could be more severe, the patient may benefit from an ERCP with biliary sphincterotomy. This may be preferable to a cholecystectomy in this 79 yo woman. Patient has seen Abiel Medrano, Alexander Carmona and UPPER [facility]ONE at [facility] for many years, and would like to address this with them. Would arrange a short term follow-up with them in the next week or two. In the meantime would keep on a low fat diet.      Ajith Pruett MD

## 2017-09-21 NOTE — PROGRESS NOTES
Progress Note      Patient: Irma Yang               Sex: female          DOA: 9/19/2017       YOB: 1929      Age:  80 y.o.        LOS:  LOS: 1 day               Subjective:   Gi note seen and appreciated . The pt will have an ultrasound of the ruq . Will follow . Objective:      Visit Vitals    /79    Pulse 98    Temp 99.5 °F (37.5 °C)    Resp 18    Ht 5' 7\" (1.702 m)    Wt 66.4 kg (146 lb 6.4 oz)    SpO2 94%    Breastfeeding No    BMI 22.93 kg/m2       Physical Exam:  Pt is awake and alert and appears to be omfortable   Heart reg rate and rhythm     Lungs good breth sounds heard   Abdomen soft and obese . nontender   Neuro nonfocal      Lab/Data Reviewed:  CMP:   Lab Results   Component Value Date/Time     (L) 09/20/2017 05:07 AM    K 3.7 09/20/2017 05:07 AM    CL 95 (L) 09/20/2017 05:07 AM    CO2 29 09/20/2017 05:07 AM    AGAP 8 09/20/2017 05:07 AM     (H) 09/20/2017 05:07 AM    BUN 12 09/20/2017 05:07 AM    CREA 0.45 (L) 09/20/2017 05:07 AM    GFRAA >60 09/20/2017 05:07 AM    GFRNA >60 09/20/2017 05:07 AM    CA 8.7 09/20/2017 05:07 AM    ALB 3.6 09/20/2017 05:07 AM    TP 6.2 (L) 09/20/2017 05:07 AM    GLOB 2.6 09/20/2017 05:07 AM    AGRAT 1.4 09/20/2017 05:07 AM    SGOT 128 (H) 09/20/2017 05:07 AM     (H) 09/20/2017 05:07 AM     CBC:   Lab Results   Component Value Date/Time    WBC 12.6 09/20/2017 05:07 AM    HGB 12.4 09/20/2017 05:07 AM    HCT 36.8 09/20/2017 05:07 AM     09/20/2017 05:07 AM           Assessment/Plan     Active Problems:    Acute pancreatitis (9/19/2017).  Etiology is unclear         Plan:pt is to have a RUQ ultrasound as per gi

## 2017-09-21 NOTE — PROGRESS NOTES
1945: Bedside shift change report given to Yasmin Arceo RN (oncoming nurse) by Ivanna Bell RN (offgoing nurse). Report included the following information SBAR, Kardex, Intake/Output, MAR and Accordion. 2300: Pt. Complaining of headache 8 out of 10 pain, pt. Given morphine. 0330: Pt. Sleeping soundly, no concerns at this time. 0730: Bedside shift change report given to Ivanna Bell RN (oncoming nurse) by Yasmin Arceo RN (offgoing nurse). Report included the following information SBAR, Kardex, Procedure Summary, Intake/Output and MAR.

## 2017-09-21 NOTE — PROGRESS NOTES
Bedside shift change report given to RN Lucille Guerra (oncoming nurse) by Katelynn Horowitz (offgoing nurse). Report included the following information SBAR.

## 2017-09-21 NOTE — PROGRESS NOTES
I was not able to assess the patient at this time and will perform a follow up visit in a few days. Ezequiel Leger M.Div.   [place] [id number]  [phone]

## 2017-09-22 VITALS
OXYGEN SATURATION: 93 % | TEMPERATURE: 98.8 F | HEIGHT: 67 IN | WEIGHT: 150.13 LBS | BODY MASS INDEX: 23.56 KG/M2 | HEART RATE: 97 BPM | DIASTOLIC BLOOD PRESSURE: 75 MMHG | SYSTOLIC BLOOD PRESSURE: 141 MMHG | RESPIRATION RATE: 16 BRPM

## 2017-09-22 LAB
ALBUMIN SERPL-MCNC: 3.2 G/DL (ref 3.4–5)
ALBUMIN/GLOB SERPL: 1.2 {RATIO} (ref 0.8–1.7)
ALP SERPL-CCNC: 62 U/L (ref 45–117)
ALT SERPL-CCNC: 57 U/L (ref 13–56)
ANION GAP SERPL CALC-SCNC: 12 MMOL/L (ref 3–18)
AST SERPL-CCNC: 22 U/L (ref 15–37)
BASOPHILS # BLD: 0 K/UL (ref 0–0.06)
BASOPHILS NFR BLD: 0 % (ref 0–2)
BILIRUB SERPL-MCNC: 0.7 MG/DL (ref 0.2–1)
BUN SERPL-MCNC: 6 MG/DL (ref 7–18)
BUN/CREAT SERPL: 16 (ref 12–20)
CALCIUM SERPL-MCNC: 8.4 MG/DL (ref 8.5–10.1)
CHLORIDE SERPL-SCNC: 94 MMOL/L (ref 100–108)
CO2 SERPL-SCNC: 26 MMOL/L (ref 21–32)
CREAT SERPL-MCNC: 0.38 MG/DL (ref 0.6–1.3)
DIFFERENTIAL METHOD BLD: ABNORMAL
EOSINOPHIL # BLD: 0.3 K/UL (ref 0–0.4)
EOSINOPHIL NFR BLD: 5 % (ref 0–5)
ERYTHROCYTE [DISTWIDTH] IN BLOOD BY AUTOMATED COUNT: 12.8 % (ref 11.6–14.5)
GLOBULIN SER CALC-MCNC: 2.7 G/DL (ref 2–4)
GLUCOSE SERPL-MCNC: 87 MG/DL (ref 74–99)
HAV IGM SER QL: NEGATIVE
HBV CORE IGM SER QL: NEGATIVE
HBV SURFACE AG SER QL: <0.1 INDEX
HBV SURFACE AG SER QL: NEGATIVE
HCT VFR BLD AUTO: 35.4 % (ref 35–45)
HCV AB SER IA-ACNC: 0.06 INDEX
HCV AB SERPL QL IA: NEGATIVE
HCV COMMENT,HCGAC: NORMAL
HGB BLD-MCNC: 12.2 G/DL (ref 12–16)
LIPASE SERPL-CCNC: 361 U/L (ref 73–393)
LYMPHOCYTES # BLD: 0.5 K/UL (ref 0.9–3.6)
LYMPHOCYTES NFR BLD: 9 % (ref 21–52)
MCH RBC QN AUTO: 31 PG (ref 24–34)
MCHC RBC AUTO-ENTMCNC: 34.5 G/DL (ref 31–37)
MCV RBC AUTO: 89.8 FL (ref 74–97)
MONOCYTES # BLD: 0.9 K/UL (ref 0.05–1.2)
MONOCYTES NFR BLD: 14 % (ref 3–10)
NEUTS SEG # BLD: 4.3 K/UL (ref 1.8–8)
NEUTS SEG NFR BLD: 72 % (ref 40–73)
PLATELET # BLD AUTO: 258 K/UL (ref 135–420)
PMV BLD AUTO: 9.3 FL (ref 9.2–11.8)
POTASSIUM SERPL-SCNC: 3.1 MMOL/L (ref 3.5–5.5)
PROT SERPL-MCNC: 5.9 G/DL (ref 6.4–8.2)
RBC # BLD AUTO: 3.94 M/UL (ref 4.2–5.3)
SODIUM SERPL-SCNC: 132 MMOL/L (ref 136–145)
SP1: NORMAL
SP2: NORMAL
SP3: NORMAL
WBC # BLD AUTO: 6 K/UL (ref 4.6–13.2)

## 2017-09-22 PROCEDURE — 74011250637 HC RX REV CODE- 250/637: Performed by: HOSPITALIST

## 2017-09-22 PROCEDURE — 77030020263 HC SOL INJ SOD CL0.9% LFCR 1000ML

## 2017-09-22 PROCEDURE — 74011250636 HC RX REV CODE- 250/636: Performed by: HOSPITALIST

## 2017-09-22 PROCEDURE — 85025 COMPLETE CBC W/AUTO DIFF WBC: CPT | Performed by: HOSPITALIST

## 2017-09-22 PROCEDURE — 97165 OT EVAL LOW COMPLEX 30 MIN: CPT

## 2017-09-22 PROCEDURE — 36415 COLL VENOUS BLD VENIPUNCTURE: CPT | Performed by: HOSPITALIST

## 2017-09-22 PROCEDURE — 80053 COMPREHEN METABOLIC PANEL: CPT | Performed by: HOSPITALIST

## 2017-09-22 PROCEDURE — 83690 ASSAY OF LIPASE: CPT | Performed by: HOSPITALIST

## 2017-09-22 RX ORDER — POTASSIUM CHLORIDE 20 MEQ/1
20 TABLET, EXTENDED RELEASE ORAL
Status: COMPLETED | OUTPATIENT
Start: 2017-09-22 | End: 2017-09-22

## 2017-09-22 RX ADMIN — POTASSIUM CHLORIDE 20 MEQ: 1500 TABLET, FILM COATED, EXTENDED RELEASE ORAL at 16:02

## 2017-09-22 RX ADMIN — METOPROLOL TARTRATE 37.5 MG: 25 TABLET ORAL at 10:12

## 2017-09-22 RX ADMIN — HEPARIN SODIUM 5000 UNITS: 5000 INJECTION, SOLUTION INTRAVENOUS; SUBCUTANEOUS at 13:09

## 2017-09-22 RX ADMIN — MORPHINE SULFATE 2 MG: 2 INJECTION, SOLUTION INTRAMUSCULAR; INTRAVENOUS at 05:13

## 2017-09-22 RX ADMIN — HEPARIN SODIUM 5000 UNITS: 5000 INJECTION, SOLUTION INTRAVENOUS; SUBCUTANEOUS at 02:08

## 2017-09-22 NOTE — PROGRESS NOTES
Problem: Falls - Risk of  Goal: *Absence of Falls  Document Kaleigh Fall Risk and appropriate interventions in the flowsheet.    Outcome: Progressing Towards Goal  Fall Risk Interventions:  Mobility Interventions: Patient to call before getting OOB           Medication Interventions: Patient to call before getting OOB     Elimination Interventions: Call light in reach

## 2017-09-22 NOTE — PROGRESS NOTES
Progress Note      Patient: Ebony Rivas               Sex: female          DOA: 9/19/2017       YOB: 1929      Age:  80 y.o.        LOS:  LOS: 2 days               Subjective:   Gi note seen and agree . The pt is comfortable and is on a low fat diet . she will be dc'd soon and will follow up with her gi group she has seen for some time . discussed also with the pt's daughter      Objective:      Visit Vitals    /88 (BP 1 Location: Left arm)    Pulse 95    Temp 99 °F (37.2 °C)    Resp 18    Ht 5' 7\" (1.702 m)    Wt 68.7 kg (151 lb 6.4 oz)    SpO2 94%    Breastfeeding No    BMI 23.71 kg/m2       Physical Exam:  Pt is alert and denies pain   Heart reg rate and rhythm  Lungs fair breath sounds heard   Abdomen soft with mild tenderness on palpation   Neuro nonfocal      Lab/Data Reviewed:  CMP: No results found for: NA, K, CL, CO2, AGAP, GLU, BUN, CREA, GFRAA, GFRNA, CA, MG, PHOS, ALB, TBIL, TP, ALB, GLOB, AGRAT, SGOT, ALT, GPT  CBC: No results found for: WBC, HGB, HGBEXT, HCT, HCTEXT, PLT, PLTEXT, HGBEXT, HCTEXT, PLTEXT        Assessment/Plan     Active Problems:    Acute pancreatitis (9/19/2017) the patient probably has biliary pancreatitis         Plan: pt will go to her gi group as per the note of dr Tonny Trivedi .   An ERCP  Has been suggested as a better alternative to a  cholecystectomy

## 2017-09-22 NOTE — PROGRESS NOTES
0730 Report received from Juan Jose. Patient received in bed awake and alert x 4, she is able to make all her needs known at this time. Patient is continent of bowel and bladder and is up with assist to bedside commode. Patient was presented to the ER with c/o mid upper abdominal pain, reported with vomiting x 1 and diaphoretic. Patient was noted with acute pancreatitis, elevated LFT's and elevated Lipase. She has a hx of HTN, diverticulosis. Upon CT patient was noted with gallstones and peripancreatic inflammation. Patient lives alone and has home health for assistance, plans are to discharge back home. 80 MD called regarding 3.0 K+ level with 20 mEq Po 1 x dose ordered for now. Patient shows zero s/s of adverse reactions and zero s/s of distress. 1521 Patient has reviewed all medications, she understands to follow up with her primary gastroenterologist upon discharge. Family is providing transportation and this patient is being discharged home. Patient is in stable condition upon discharge.

## 2017-09-22 NOTE — PROGRESS NOTES
Patient presents with abdominal pain, which seems to be r/t constipation. Patient had large bowel movent, attempting to clean up independently. Patient unsuccessful, staff made aware by phlebotomy. Patient assisted x's 1.

## 2017-09-22 NOTE — MANAGEMENT PLAN
Discharge Plan    Emailed Registration and Faxed patient insurance information down. Pt has Medicare [employer]&B and  [employer].  Discharge Plan is Home today      Josias Roberts RN BSN  Outcomes Manager  Pager # [phone]

## 2017-09-22 NOTE — PROGRESS NOTES
Problem: Falls - Risk of  Goal: *Absence of Falls  Document Kaleigh Fall Risk and appropriate interventions in the flowsheet.    Outcome: Progressing Towards Goal  Fall Risk Interventions:  Mobility Interventions: Patient to call before getting OOB           Medication Interventions: Patient to call before getting OOB     Elimination Interventions: Call light in reach, Patient to call for help with toileting needs

## 2017-09-22 NOTE — PROGRESS NOTES
Problem: Self Care Deficits Care Plan (Adult)  Goal: *Acute Goals and Plan of Care (Insert Text)  Outcome: Resolved/Met Date Met:  09/22/17  OCCUPATIONAL THERAPY EVALUATION/DISCHARGE     Patient: Adria Sargent (80 y.o. female)  Date: 9/22/2017  Primary Diagnosis: Acute pancreatitis        Precautions:  Fall      ASSESSMENT AND RECOMMENDATIONS:  Based on the objective data described below, the patient presents with supervision for bed mobility and functional transfers and mod I in ADLs. Pt performing at [facility] based on chart review, pt report, and functional eval. Pt had no c/o of pain. A&O x4. Appropriate decision making t/o session. Supervision for bed mobility & transfer with rollator. Good/fair balance. Able to don/doff socks independently. Per pt report, has assistance for tasks such as cooking & cleaning. Pt educated on role of OT and POC; she  Verbalized understanding. Pt is performing at [facility]; skilled therapy not indicated at this time. Recommend HH upon d/c. Pt left supine with needs within reach & care manager present. Skilled occupational therapy is not indicated at this time. Discharge Recommendations: Home Health  Further Equipment Recommendations for Discharge: shower chair        SUBJECTIVE:   Patient stated I'm happy. The doctor said I could go home today. \"      OBJECTIVE DATA SUMMARY:   No past medical history on file. No past surgical history on file. Barriers to Learning/Limitations: None  Compensate with: visual, verbal, tactile, kinesthetic cues/model     G CODES:  Self Care  Current  CI= 1-19%   Goal  CI= 1-19%   D/C  CI= 1-19%. The severity rating is based on the Other Functional Assessment, MMT, ROM     Eval Complexity: History: LOW Complexity : Brief history review ; Examination: LOW Complexity : 1-3 performance deficits relating to physical, cognitive , or psychosocial skils that result in activity limitations and / or participation restrictions ;  Decision Making:LOW Complexity : No comorbidities that affect functional and no verbal or physical assistance needed to complete eval tasks       Prior Level of Function/Home Situation: Pt was modified independent with basic self care tasks and utilized rollator for functional mobility PTA. Home Situation  Home Environment: Private residence  # Steps to Enter: 0  One/Two Story Residence: One story (once on 2nd floor; has elevator)  # of Interior Steps: 15  Interior Rails: Both  Lift Chair Available:  (Elevator )  Living Alone: Yes  Support Systems: Family member(s)  Patient Expects to be Discharged to[de-identified] Private residence  Current DME Used/Available at Home: Grab bars, Walker, rollator  Tub or Shower Type: Shower (has grab bars)  [X]     Right hand dominant       [ ]     Left hand dominant     Cognitive/Behavioral Status:  Neurologic State: Alert  Orientation Level: Oriented X4  Cognition: Appropriate decision making; Appropriate for age attention/concentration; Appropriate safety awareness; Follows commands  Safety/Judgement: Awareness of environment; Fall prevention      Skin: Intact (BUes)  Edema: None noted (BUes)     Vision/Perceptual:    Acuity: Within Defined Limits       Coordination:  Coordination: Within functional limits (BUEs)  Fine Motor Skills-Upper: Right Intact; Left Intact    Gross Motor Skills-Upper: Right Intact; Left Intact      Balance:  Sitting: Impaired  Sitting - Static: Good (unsupported)  Sitting - Dynamic: Fair (occasional)  Standing: Impaired; With support  Standing - Static: Fair  Standing - Dynamic : Fair      Strength:  Strength:  Within functional limits (BUEs: 4+/5)     Range of Motion:  AROM: Generally decreased, functional (BUEs: 1/2 shoulder flex)     Functional Mobility and Transfers for ADLs:  Bed Mobility:   Supine to Sit: Supervision  Sit to Supine: Supervision     Transfers:  Sit to Stand: Stand-by asssistance              Toilet Transfer :  (not assessed; pt declined)      ADL Assessment:  Feeding: Setup;Supervision  Oral Facial Hygiene/Grooming: Setup;Supervision  Bathing: Stand-by assistance  Upper Body Dressing: Setup;Modified independent  Lower Body Dressing: Supervision  Toileting: Supervision     Cognitive Retraining  Safety/Judgement: Awareness of environment; Fall prevention     Pain:  Pre-treatment pain level: 0/10  Post treatment pain level: 0/10  Pain Scale 1: Numeric (0 - 10)     Activity Tolerance:  Fair+  Please refer to the flowsheet for vital signs taken during this treatment. After treatment:   [ ]  Patient left in no apparent distress sitting up in chair  [X]  Patient left in no apparent distress in bed  [X]  Call bell left within reach  [X]  Nursing notified  [ ]  Caregiver present  [ ]  Bed alarm activated      COMMUNICATION/EDUCATION: Pt educated on role of OT, POC, and home safety. She verbalized understanding. Communication/Collaboration:  [X]      Home safety education was provided and the patient/caregiver indicated understanding. [X]      Patient/family have participated as able and agree with findings and recommendations. [ ]      Patient is unable to participate in plan of care at this time.      Angeles Dunbar MS OTR/L  Time Calculation: 14 mins

## 2017-09-22 NOTE — DISCHARGE INSTRUCTIONS
DISCHARGE SUMMARY from Nurse    The following personal items are in your possession at time of discharge:    Dental Appliances: None  Visual Aid: Glasses, With patient     Home Medications: None  Jewelry: Earrings, Ring, With patient  Clothing: Pants, Shirt, Undergarments, With patient  Other Valuables: Eyeglasses, With patient             PATIENT INSTRUCTIONS:    After general anesthesia or intravenous sedation, for 24 hours or while taking prescription Narcotics:  · Limit your activities  · Do not drive and operate hazardous machinery  · Do not make important personal or business decisions  · Do  not drink alcoholic beverages  · If you have not urinated within 8 hours after discharge, please contact your surgeon on call. Report the following to your surgeon:  · Excessive pain, swelling, redness or odor of or around the surgical area  · Temperature over 100.5  · Nausea and vomiting lasting longer than 4 hours or if unable to take medications  · Any signs of decreased circulation or nerve impairment to extremity: change in color, persistent  numbness, tingling, coldness or increase pain  · Any questions        What to do at Home:  Recommended activity: Activity as tolerated,     If you experience any of the following symptoms shortness of breath, abdominal pain, or chest pain, please follow up with 911. *  Please give a list of your current medications to your Primary Care Provider. *  Please update this list whenever your medications are discontinued, doses are      changed, or new medications (including over-the-counter products) are added. *  Please carry medication information at all times in case of emergency situations. These are general instructions for a healthy lifestyle:    No smoking/ No tobacco products/ Avoid exposure to second hand smoke    Surgeon General's Warning:  Quitting smoking now greatly reduces serious risk to your health.     Obesity, smoking, and sedentary lifestyle greatly increases your risk for illness    A healthy diet, regular physical exercise & weight monitoring are important for maintaining a healthy lifestyle    You may be retaining fluid if you have a history of heart failure or if you experience any of the following symptoms:  Weight gain of 3 pounds or more overnight or 5 pounds in a week, increased swelling in our hands or feet or shortness of breath while lying flat in bed. Please call your doctor as soon as you notice any of these symptoms; do not wait until your next office visit. Recognize signs and symptoms of STROKE:    F-face looks uneven    A-arms unable to move or move unevenly    S-speech slurred or non-existent    T-time-call 911 as soon as signs and symptoms begin-DO NOT go       Back to bed or wait to see if you get better-TIME IS BRAIN. Warning Signs of HEART ATTACK     Call 911 if you have these symptoms:   Chest discomfort. Most heart attacks involve discomfort in the center of the chest that lasts more than a few minutes, or that goes away and comes back. It can feel like uncomfortable pressure, squeezing, fullness, or pain.  Discomfort in other areas of the upper body. Symptoms can include pain or discomfort in one or both arms, the back, neck, jaw, or stomach.  Shortness of breath with or without chest discomfort.  Other signs may include breaking out in a cold sweat, nausea, or lightheadedness. Don't wait more than five minutes to call 911 - MINUTES MATTER! Fast action can save your life. Calling 911 is almost always the fastest way to get lifesaving treatment. Emergency Medical Services staff can begin treatment when they arrive -- up to an hour sooner than if someone gets to the hospital by car. The discharge information has been reviewed with the patient. The patient verbalized understanding.     Discharge medications reviewed with the patient and appropriate educational materials and side effects teaching were provided. I have reviewed discharge instructions with the patient. The patient verbalized understanding.     Patient armband removed and shredded

## 2017-09-22 NOTE — PROGRESS NOTES
Bedside shift change report given to AMRITA Bello (oncoming nurse) by Tracy Guerra (offgoing nurse). Report included the following information SBAR.

## 2017-10-01 NOTE — DISCHARGE SUMMARY
Discharge Summary    Patient: Beryle Charter               Sex: female          DOA: 9/19/2017         YOB: 1929      Age:  80 y.o.        LOS:  LOS: 3 days                Admit Date: 9/19/2017    Discharge Date: 10/1/2017    Admission Diagnoses: Acute pancreatitis    Discharge Diagnoses:    Problem List as of 9/22/2017  Never Reviewed          Codes Class Noted - Resolved    Acute pancreatitis ICD-10-CM: K85.90  ICD-9-CM: 152.2  9/19/2017 - Present              Discharge Condition:  Amira Ort Course:pt is an 80year old female who was admitted because of an onset of abdominal particularly in the epigastric region  She developed pain a day or so before she was admitted . She has not had any previous h/o pancreatitis . In the er it was seen that her lipase level was 22,120 . The ct scan of her abdomen showed the presence of considerable peripancreatic  inflammatory stranding  compatible with acute pancreatitis . She also had incidental  Compression deformities in the  Thoracolumbar spine . The pt had an ultrasound of the gallbladder done. Her  lfts were elevated on admission. The us  showed biliary sludge  without definite cholelithiasis . No abnormal  Biliary dilation  ot evidence of  Acute cholecystitis    A questionable vaguely decreased  echogenicity  within the pancreatic head  was seen  and possible correlates with the  ct showing  acute pancreatitis . Hepatic steatosis was also seen . The pt was hydrated with iv fluids and was kept npo. Her lipase level dropped to 361 and her liver function tests normalized her pain improved considerably . The pt was seen by gi who felt that her diagnosis was acute uncomplicated pancreatitis with a suspicion that she had  Biliary pancreatitis . In order to prevent a recurrence of  her pancreatitis which could be more severe it was felt that the pt  Should have an ercp with  a biliary sphincterotomy.  It was felt that this would be preferable to a cholecystectomy in an 80year old woman . she was then sent back to her gi physicians Teto Vazquez and Leonora Lyman . The pt was discharged . Consults:    Treatment Team: Care Manager: Naresh Zavala RN; Utilization Review: Irving Lester RN; Consulting Provider: Maryanne Shanks MD; Physical Therapist: Rosa Khalil PT    Significant Diagnostic Studies:     Discharge Medications:   Cannot display discharge medications since this patient is not currently admitted.       Activity: as tolerated    Diet: low fat diet    Wound Care:none    Follow-up: with gi and with pcp

## 2017-10-02 NOTE — H&P
Response to query . Pt had pancreatitis and was treated with iv fluids . A sodium of 132 was not a problem per se as he was dc'd with a sodium of 132 .

## 2018-01-26 ENCOUNTER — IMPORTED ENCOUNTER (OUTPATIENT)
Dept: URBAN - METROPOLITAN AREA CLINIC 1 | Facility: CLINIC | Age: 83
End: 2018-01-26

## 2018-01-26 PROBLEM — H43.811: Noted: 2018-01-26

## 2018-01-26 PROBLEM — H25.813: Noted: 2018-01-26

## 2018-01-26 PROBLEM — H04.123: Noted: 2018-01-26

## 2018-01-26 PROCEDURE — 92015 DETERMINE REFRACTIVE STATE: CPT

## 2018-01-26 PROCEDURE — 92004 COMPRE OPH EXAM NEW PT 1/>: CPT

## 2018-01-26 NOTE — PATIENT DISCUSSION
1.  Cataract OU:  Visually Significant; however patient wishes to hold on sx. MRx for glasses given. 2.  Dry Eyes OU - Cont ATs TID OU Routinely. 3.  PVD w/o Tear OD - RD precautions. Letter to PCP Return for an appointment in 6 mo 10dfe glare with Dr. Spring Wheeler.

## 2022-04-02 ASSESSMENT — VISUAL ACUITY
OS_SC: 20/100
OD_SC: 20/70+2

## 2022-04-02 ASSESSMENT — TONOMETRY
OD_IOP_MMHG: 16
OS_IOP_MMHG: 16